# Patient Record
Sex: MALE | Race: WHITE | URBAN - METROPOLITAN AREA
[De-identification: names, ages, dates, MRNs, and addresses within clinical notes are randomized per-mention and may not be internally consistent; named-entity substitution may affect disease eponyms.]

---

## 2020-12-28 ENCOUNTER — NURSE TRIAGE (OUTPATIENT)
Dept: OTHER | Facility: OTHER | Age: 30
End: 2020-12-28

## 2020-12-28 DIAGNOSIS — Z11.59 SPECIAL SCREENING EXAMINATION FOR UNSPECIFIED VIRAL DISEASE: Primary | ICD-10-CM

## 2020-12-29 DIAGNOSIS — Z11.59 SPECIAL SCREENING EXAMINATION FOR UNSPECIFIED VIRAL DISEASE: ICD-10-CM

## 2020-12-29 PROCEDURE — 87635 SARS-COV-2 COVID-19 AMP PRB: CPT | Performed by: FAMILY MEDICINE

## 2020-12-30 LAB — SARS-COV-2 RNA RESP QL NAA+PROBE: NEGATIVE

## 2024-01-14 PROBLEM — L30.9 ECZEMA: Status: ACTIVE | Noted: 2024-01-14

## 2024-01-14 PROBLEM — J45.20 MILD INTERMITTENT ASTHMA WITHOUT COMPLICATION: Status: ACTIVE | Noted: 2024-01-14

## 2024-01-14 PROBLEM — N39.44 ENURESIS, NOCTURNAL ONLY: Status: ACTIVE | Noted: 2024-01-14

## 2024-01-14 PROBLEM — G89.29 CHRONIC PAIN OF LEFT ANKLE: Status: ACTIVE | Noted: 2024-01-14

## 2024-01-14 PROBLEM — F33.1 MDD (MAJOR DEPRESSIVE DISORDER), RECURRENT EPISODE, MODERATE (HCC): Status: ACTIVE | Noted: 2024-01-14

## 2024-01-14 PROBLEM — E55.9 VITAMIN D DEFICIENCY: Status: ACTIVE | Noted: 2024-01-14

## 2024-01-14 PROBLEM — E78.2 MIXED HYPERLIPIDEMIA: Status: ACTIVE | Noted: 2024-01-14

## 2024-01-14 PROBLEM — M25.572 CHRONIC PAIN OF LEFT ANKLE: Status: ACTIVE | Noted: 2024-01-14

## 2024-01-18 ENCOUNTER — OFFICE VISIT (OUTPATIENT)
Dept: FAMILY MEDICINE CLINIC | Facility: CLINIC | Age: 34
End: 2024-01-18
Payer: COMMERCIAL

## 2024-01-18 ENCOUNTER — APPOINTMENT (OUTPATIENT)
Dept: LAB | Facility: CLINIC | Age: 34
End: 2024-01-18
Payer: COMMERCIAL

## 2024-01-18 VITALS
DIASTOLIC BLOOD PRESSURE: 90 MMHG | TEMPERATURE: 97.2 F | HEART RATE: 78 BPM | HEIGHT: 69 IN | OXYGEN SATURATION: 98 % | SYSTOLIC BLOOD PRESSURE: 124 MMHG | WEIGHT: 315 LBS | RESPIRATION RATE: 20 BRPM | BODY MASS INDEX: 46.65 KG/M2

## 2024-01-18 DIAGNOSIS — E66.01 CLASS 3 SEVERE OBESITY DUE TO EXCESS CALORIES WITH SERIOUS COMORBIDITY AND BODY MASS INDEX (BMI) OF 50.0 TO 59.9 IN ADULT (HCC): ICD-10-CM

## 2024-01-18 DIAGNOSIS — Z11.4 SCREENING FOR HIV (HUMAN IMMUNODEFICIENCY VIRUS): ICD-10-CM

## 2024-01-18 DIAGNOSIS — G47.00 PERSISTENT INSOMNIA: ICD-10-CM

## 2024-01-18 DIAGNOSIS — N52.9 ERECTILE DYSFUNCTION, UNSPECIFIED ERECTILE DYSFUNCTION TYPE: ICD-10-CM

## 2024-01-18 DIAGNOSIS — F33.1 MDD (MAJOR DEPRESSIVE DISORDER), RECURRENT EPISODE, MODERATE (HCC): ICD-10-CM

## 2024-01-18 DIAGNOSIS — Z11.59 NEED FOR HEPATITIS C SCREENING TEST: ICD-10-CM

## 2024-01-18 DIAGNOSIS — F41.9 ANXIETY: Chronic | ICD-10-CM

## 2024-01-18 DIAGNOSIS — E55.9 VITAMIN D DEFICIENCY: ICD-10-CM

## 2024-01-18 DIAGNOSIS — E03.9 ADULT HYPOTHYROIDISM: ICD-10-CM

## 2024-01-18 DIAGNOSIS — F41.9 ANXIETY: ICD-10-CM

## 2024-01-18 DIAGNOSIS — E78.2 MIXED HYPERLIPIDEMIA: ICD-10-CM

## 2024-01-18 DIAGNOSIS — E03.8 SUBCLINICAL HYPOTHYROIDISM: ICD-10-CM

## 2024-01-18 DIAGNOSIS — R79.89 LOW TESTOSTERONE: ICD-10-CM

## 2024-01-18 DIAGNOSIS — Z00.00 WELL ADULT EXAM: Primary | ICD-10-CM

## 2024-01-18 DIAGNOSIS — Z23 ENCOUNTER FOR IMMUNIZATION: ICD-10-CM

## 2024-01-18 DIAGNOSIS — Z00.00 WELL ADULT EXAM: ICD-10-CM

## 2024-01-18 DIAGNOSIS — R74.8 ELEVATED LIVER ENZYMES: ICD-10-CM

## 2024-01-18 DIAGNOSIS — R68.82 LOW LIBIDO: ICD-10-CM

## 2024-01-18 DIAGNOSIS — F90.2 ATTENTION DEFICIT HYPERACTIVITY DISORDER (ADHD), COMBINED TYPE: ICD-10-CM

## 2024-01-18 LAB
25(OH)D3 SERPL-MCNC: 22.8 NG/ML (ref 30–100)
ALBUMIN SERPL BCP-MCNC: 4.5 G/DL (ref 3.5–5)
ALP SERPL-CCNC: 57 U/L (ref 34–104)
ALT SERPL W P-5'-P-CCNC: 74 U/L (ref 7–52)
ANION GAP SERPL CALCULATED.3IONS-SCNC: 9 MMOL/L
AST SERPL W P-5'-P-CCNC: 58 U/L (ref 13–39)
BASOPHILS # BLD AUTO: 0.05 THOUSANDS/ÂΜL (ref 0–0.1)
BASOPHILS NFR BLD AUTO: 1 % (ref 0–1)
BILIRUB SERPL-MCNC: 1.04 MG/DL (ref 0.2–1)
BUN SERPL-MCNC: 11 MG/DL (ref 5–25)
CALCIUM SERPL-MCNC: 9.5 MG/DL (ref 8.4–10.2)
CHLORIDE SERPL-SCNC: 102 MMOL/L (ref 96–108)
CHOLEST SERPL-MCNC: 253 MG/DL
CO2 SERPL-SCNC: 24 MMOL/L (ref 21–32)
CREAT SERPL-MCNC: 1.11 MG/DL (ref 0.6–1.3)
EOSINOPHIL # BLD AUTO: 0.18 THOUSAND/ÂΜL (ref 0–0.61)
EOSINOPHIL NFR BLD AUTO: 3 % (ref 0–6)
ERYTHROCYTE [DISTWIDTH] IN BLOOD BY AUTOMATED COUNT: 12.4 % (ref 11.6–15.1)
GFR SERPL CREATININE-BSD FRML MDRD: 86 ML/MIN/1.73SQ M
GLUCOSE P FAST SERPL-MCNC: 86 MG/DL (ref 65–99)
HCT VFR BLD AUTO: 44.1 % (ref 36.5–49.3)
HDLC SERPL-MCNC: 45 MG/DL
HGB BLD-MCNC: 14.8 G/DL (ref 12–17)
IMM GRANULOCYTES # BLD AUTO: 0.02 THOUSAND/UL (ref 0–0.2)
IMM GRANULOCYTES NFR BLD AUTO: 0 % (ref 0–2)
LDLC SERPL CALC-MCNC: 180 MG/DL (ref 0–100)
LYMPHOCYTES # BLD AUTO: 1.49 THOUSANDS/ÂΜL (ref 0.6–4.47)
LYMPHOCYTES NFR BLD AUTO: 24 % (ref 14–44)
MCH RBC QN AUTO: 30.8 PG (ref 26.8–34.3)
MCHC RBC AUTO-ENTMCNC: 33.6 G/DL (ref 31.4–37.4)
MCV RBC AUTO: 92 FL (ref 82–98)
MONOCYTES # BLD AUTO: 0.89 THOUSAND/ÂΜL (ref 0.17–1.22)
MONOCYTES NFR BLD AUTO: 14 % (ref 4–12)
NEUTROPHILS # BLD AUTO: 3.58 THOUSANDS/ÂΜL (ref 1.85–7.62)
NEUTS SEG NFR BLD AUTO: 58 % (ref 43–75)
NONHDLC SERPL-MCNC: 208 MG/DL
NRBC BLD AUTO-RTO: 0 /100 WBCS
PLATELET # BLD AUTO: 219 THOUSANDS/UL (ref 149–390)
PMV BLD AUTO: 9.9 FL (ref 8.9–12.7)
POTASSIUM SERPL-SCNC: 4.3 MMOL/L (ref 3.5–5.3)
PROT SERPL-MCNC: 7.5 G/DL (ref 6.4–8.4)
RBC # BLD AUTO: 4.8 MILLION/UL (ref 3.88–5.62)
SODIUM SERPL-SCNC: 135 MMOL/L (ref 135–147)
TRIGL SERPL-MCNC: 141 MG/DL
TSH SERPL DL<=0.05 MIU/L-ACNC: 5.76 UIU/ML (ref 0.45–4.5)
WBC # BLD AUTO: 6.21 THOUSAND/UL (ref 4.31–10.16)

## 2024-01-18 PROCEDURE — 99204 OFFICE O/P NEW MOD 45 MIN: CPT | Performed by: FAMILY MEDICINE

## 2024-01-18 PROCEDURE — 86803 HEPATITIS C AB TEST: CPT

## 2024-01-18 PROCEDURE — 80053 COMPREHEN METABOLIC PANEL: CPT

## 2024-01-18 PROCEDURE — 90715 TDAP VACCINE 7 YRS/> IM: CPT

## 2024-01-18 PROCEDURE — 82306 VITAMIN D 25 HYDROXY: CPT

## 2024-01-18 PROCEDURE — 36415 COLL VENOUS BLD VENIPUNCTURE: CPT

## 2024-01-18 PROCEDURE — 84439 ASSAY OF FREE THYROXINE: CPT

## 2024-01-18 PROCEDURE — 99385 PREV VISIT NEW AGE 18-39: CPT | Performed by: FAMILY MEDICINE

## 2024-01-18 PROCEDURE — 85025 COMPLETE CBC W/AUTO DIFF WBC: CPT

## 2024-01-18 PROCEDURE — 80061 LIPID PANEL: CPT

## 2024-01-18 PROCEDURE — 87389 HIV-1 AG W/HIV-1&-2 AB AG IA: CPT | Performed by: FAMILY MEDICINE

## 2024-01-18 PROCEDURE — 90471 IMMUNIZATION ADMIN: CPT

## 2024-01-18 PROCEDURE — 84443 ASSAY THYROID STIM HORMONE: CPT

## 2024-01-18 RX ORDER — MOMETASONE FUROATE 1 MG/G
OINTMENT TOPICAL
COMMUNITY
Start: 2023-10-16

## 2024-01-18 RX ORDER — LEVOTHYROXINE SODIUM 0.03 MG/1
25 TABLET ORAL DAILY
COMMUNITY
Start: 2023-10-16

## 2024-01-18 RX ORDER — DIPHENOXYLATE HYDROCHLORIDE AND ATROPINE SULFATE 2.5; .025 MG/1; MG/1
1 TABLET ORAL DAILY
COMMUNITY
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
## 2024-01-18 NOTE — ASSESSMENT & PLAN NOTE
Not at goal. He will check on coverage for GLP. Reviewed side effects and that this is a lifetime medication.     Pt has not had success with weight loss with healthy diet and exercise alone, but will continue to work on these.   he  has not been on any weight loss medicines in the past 12 months  Body mass index is 58.12 kg/m².  This is a new start - he is not on another GLP medication currently or in the past 12 months   No personal or family history of thyroid cancer  No personal history of pancreatitis  A GLP medication is recommended for improvement in weight and comorbidities associated with obesity   Risks and benefits and side effects of medication reviewed   Follow up 3 months after starting GLP

## 2024-01-18 NOTE — ASSESSMENT & PLAN NOTE
Not at goal. Was on Prozac 80 mg. Recommend we try a different SSRI and then consider addition of wellbutrin if needed. Risks and benefits and side effects reviewed with pt. Start zoloft 25 mg x 8 days then 50 mg. Can add wellbutrin at follow up, or we can start sooner if he wishes.

## 2024-01-18 NOTE — ASSESSMENT & PLAN NOTE
Diagnosed by neuropsych eval at [facility]. Will request records. History c/w ADHD. Recommend treatment at follow up.

## 2024-01-19 LAB
HCV AB SER QL: NORMAL
HIV 1+2 AB+HIV1 P24 AG SERPL QL IA: NORMAL
HIV 2 AB SERPL QL IA: NORMAL
HIV1 AB SERPL QL IA: NORMAL
HIV1 P24 AG SERPL QL IA: NORMAL
T4 FREE SERPL-MCNC: 1.01 NG/DL (ref 0.61–1.12)

## 2024-01-19 RX ORDER — ERGOCALCIFEROL 1.25 MG/1
50000 CAPSULE ORAL WEEKLY
Qty: 12 CAPSULE | Refills: 0 | Status: SHIPPED | OUTPATIENT
Start: 2024-01-19

## 2024-01-23 ENCOUNTER — TELEPHONE (OUTPATIENT)
Dept: FAMILY MEDICINE CLINIC | Facility: CLINIC | Age: 34
End: 2024-01-23

## 2024-01-23 NOTE — TELEPHONE ENCOUNTER
It looks like the testosterone was not done on this patient, but I am not sure why. Please be sure the lab made the patient aware.

## 2024-01-24 ENCOUNTER — LAB (OUTPATIENT)
Dept: LAB | Age: 34
End: 2024-01-24
Payer: COMMERCIAL

## 2024-01-24 DIAGNOSIS — E03.8 SUBCLINICAL HYPOTHYROIDISM: ICD-10-CM

## 2024-01-24 LAB
T4 FREE SERPL-MCNC: 1.05 NG/DL (ref 0.61–1.12)
TSH SERPL DL<=0.05 MIU/L-ACNC: 7.18 UIU/ML (ref 0.45–4.5)

## 2024-01-24 PROCEDURE — 84402 ASSAY OF FREE TESTOSTERONE: CPT

## 2024-01-24 PROCEDURE — 84403 ASSAY OF TOTAL TESTOSTERONE: CPT

## 2024-01-24 PROCEDURE — 86376 MICROSOMAL ANTIBODY EACH: CPT

## 2024-01-24 PROCEDURE — 84443 ASSAY THYROID STIM HORMONE: CPT

## 2024-01-24 PROCEDURE — 84439 ASSAY OF FREE THYROXINE: CPT

## 2024-01-24 PROCEDURE — 36415 COLL VENOUS BLD VENIPUNCTURE: CPT

## 2024-01-25 LAB
TESTOST FREE SERPL-MCNC: 7.8 PG/ML (ref 8.7–25.1)
TESTOST SERPL-MCNC: 408 NG/DL (ref 264–916)
THYROPEROXIDASE AB SERPL-ACNC: <9 IU/ML (ref 0–34)

## 2024-01-27 PROBLEM — R79.89 ELEVATED LFTS: Status: ACTIVE | Noted: 2024-01-27

## 2024-01-27 PROBLEM — R79.89 LOW VITAMIN D LEVEL: Status: ACTIVE | Noted: 2024-01-27

## 2024-01-27 PROBLEM — E78.00 HYPERCHOLESTEROLEMIA: Status: ACTIVE | Noted: 2024-01-27

## 2024-01-27 PROBLEM — E03.8 SUBCLINICAL HYPOTHYROIDISM: Status: ACTIVE | Noted: 2024-01-18

## 2024-01-27 NOTE — RESULT ENCOUNTER NOTE
Call patient with results -per the last lab result I wanted him to repeat his thyroid blood work in about a month.  He has this too early.  I will order repeat blood work when I see him at his visit.  But you can let him know that the TPO antibodies were normal, meaning his elevated TSH will likely return to normal without needing treatment.

## 2024-02-09 DIAGNOSIS — F41.9 ANXIETY: Chronic | ICD-10-CM

## 2024-02-09 DIAGNOSIS — F33.1 MDD (MAJOR DEPRESSIVE DISORDER), RECURRENT EPISODE, MODERATE (HCC): ICD-10-CM

## 2024-02-18 PROBLEM — E78.00 PURE HYPERCHOLESTEROLEMIA: Status: ACTIVE | Noted: 2024-01-14

## 2024-02-18 PROBLEM — R79.89 LOW TESTOSTERONE: Status: ACTIVE | Noted: 2024-02-18

## 2024-02-20 ENCOUNTER — OFFICE VISIT (OUTPATIENT)
Dept: FAMILY MEDICINE CLINIC | Facility: CLINIC | Age: 34
End: 2024-02-20
Payer: COMMERCIAL

## 2024-02-20 VITALS
BODY MASS INDEX: 46.65 KG/M2 | SYSTOLIC BLOOD PRESSURE: 124 MMHG | DIASTOLIC BLOOD PRESSURE: 82 MMHG | WEIGHT: 315 LBS | RESPIRATION RATE: 17 BRPM | HEIGHT: 69 IN | HEART RATE: 68 BPM | TEMPERATURE: 98.6 F | OXYGEN SATURATION: 98 %

## 2024-02-20 DIAGNOSIS — E78.00 PURE HYPERCHOLESTEROLEMIA: ICD-10-CM

## 2024-02-20 DIAGNOSIS — R79.89 ELEVATED LFTS: ICD-10-CM

## 2024-02-20 DIAGNOSIS — E55.9 VITAMIN D DEFICIENCY: ICD-10-CM

## 2024-02-20 DIAGNOSIS — F33.1 MDD (MAJOR DEPRESSIVE DISORDER), RECURRENT EPISODE, MODERATE (HCC): Primary | ICD-10-CM

## 2024-02-20 DIAGNOSIS — F41.9 ANXIETY: Chronic | ICD-10-CM

## 2024-02-20 DIAGNOSIS — R06.83 SNORING: Chronic | ICD-10-CM

## 2024-02-20 DIAGNOSIS — R79.89 LOW TESTOSTERONE: ICD-10-CM

## 2024-02-20 DIAGNOSIS — F90.2 ATTENTION DEFICIT HYPERACTIVITY DISORDER (ADHD), COMBINED TYPE: ICD-10-CM

## 2024-02-20 DIAGNOSIS — E66.01 CLASS 3 SEVERE OBESITY DUE TO EXCESS CALORIES WITH SERIOUS COMORBIDITY AND BODY MASS INDEX (BMI) OF 50.0 TO 59.9 IN ADULT (HCC): ICD-10-CM

## 2024-02-20 DIAGNOSIS — E03.8 SUBCLINICAL HYPOTHYROIDISM: ICD-10-CM

## 2024-02-20 PROCEDURE — 99214 OFFICE O/P EST MOD 30 MIN: CPT | Performed by: FAMILY MEDICINE

## 2024-02-20 RX ORDER — SERTRALINE HYDROCHLORIDE 100 MG/1
100 TABLET, FILM COATED ORAL DAILY
Qty: 90 TABLET | Refills: 3 | Status: SHIPPED | OUTPATIENT
Start: 2024-02-20

## 2024-02-20 RX ORDER — DEXTROAMPHETAMINE SACCHARATE, AMPHETAMINE ASPARTATE MONOHYDRATE, DEXTROAMPHETAMINE SULFATE AND AMPHETAMINE SULFATE 3.75; 3.75; 3.75; 3.75 MG/1; MG/1; MG/1; MG/1
15 CAPSULE, EXTENDED RELEASE ORAL EVERY MORNING
Qty: 30 CAPSULE | Refills: 0 | Status: SHIPPED | OUTPATIENT
Start: 2024-02-20

## 2024-02-20 NOTE — ASSESSMENT & PLAN NOTE
Not well controlled. Increase zoloft from 50 to 100 mg daily. Send MyChart message in 4 weeks or so.

## 2024-02-20 NOTE — ASSESSMENT & PLAN NOTE
Recommend Zepbound. He will check on coverage.   Pt has not had success with weight loss with healthy diet and exercise alone, but will continue to work on these.   he  has not been on any weight loss medicines in the past 12 months  Body mass index is 56.29 kg/m².  This is a new start - he is not on another GLP medication currently or in the past 12 months   No personal or family history of thyroid cancer  No personal history of pancreatitis  A GLP medication is recommended for improvement in weight and comorbidities associated with obesity   Risks and benefits and side effects of medication reviewed   Follow up 3 months after starting GLP

## 2024-02-20 NOTE — PATIENT INSTRUCTIONS
Increase zoloft from 50 to 100 mg daily   Start Adderall 20 mg before work or the start of your day.     Weight Loss Medications - please read carefully   Saxenda, Wegovy, *Zepbound are FDA approved for weight loss with a prior authorization. Call to see if these are approved by your insurance.     We have the additional options of Ozempic or Mounjaro if you have type 2 diabetes only.       How to start the process:  Call to see if one of the above medications are covered by your insurance.  Next call around to see if any pharmacies have the medication in stock. We cannot fill it until you know they have it in stock.   Next send us a [employer] message to let us know which one so we can send it in. The medication will need to be increased either weekly (for Saxenda) or monthly (for the others), so we cannot send it to a mail away pharmacy until you get to the maintenance dose.   Once  the prescription gets sent in we will need to get a prior auth from the insurance company. Allow 1 - 2 weeks for this process. If you have not hear anything regarding approval or denial from our office by this time, please reach out to us.  Once you start the medication you will need to contact our office every 2 - 3 weeks in order to get the next dose. Let us know your current dose and what pharmacy you want it sent to. You can call or send a [employer] message. Do not send a Medication Refill message, or have the pharmacy contact us,  or the last dose will be refilled automatically.   We will need to see you about every 3 months to check on how this is working and to communicate with your insurance company that you are doing well. Please keep these scheduled appointments.     Saxenda  This is how to start Saxenda. It is a daily injection to help with weight loss.     WEEK 1: 0.6mg daily  -if tolerated,  WEEK 2: 1.2mg daily  -if tolerated,  WEEK 3: 1.8mg daily  -if tolerated,  WEEK 4: 2.4mg daily  -if tolerated,  WEEK 5: 3mg daily and then  continue at this dose     If you miss more than 3 days of the medication you should restart this from the beginning.     VISIT [email or web address] to see about coupons, how to give yourself injections, and more information     If you develop nausea or vomiting, STOP the medication for a few days, then DECREASE to the previous dose that you tolerated well.   Stay well hydrated.  You can inject in your stomach, upper leg or upper arm   Unused pens should be stored in the refrigerator, but the pen in use can he kept at room temperature for up to 30 days   If you develop severe abdominal pain, pain radiating from your abdomen to the back, or fever associated with abdominal pain/vomiting, please call or go to the ER as this can be a sign of pancreatitis which can be an adverse effect of the medication.    Action: Cause slower gastric emptying which will increase feeling full with meals.  This feeling fullness will allow for you to reduce calorie intake sooner than usual and begin to lose some weight.  These medications also will reduce production of glucose form your liver to allow for better control of your sugars.   Reminders: These medications do not cause low blood sugars.   Keep in the refrigerator until you use it once, then keep it out of refrigerator.  You ONLY need to prime the pen upon open it monthly.  Clean your hands and site of injection to avoid infection risks. Store used needles in old plastic laundry detergent bin or coffee bin, then tape it when full and discard in garbage.   Side Effects:  Common side effects include full sensation with eating, nausea which usually improves over the first 1-2 weeks. Soreness, redness or lump at site of injection.  Dangers:  Pancreatitis can happen with this medicine for some people; therefore if you have ever had pancreatitis or have severe nausea vomiting and abdominal pain while on this medicine stop it immediately and call us or go to ER for assistance. Do not use if you  have had a history of thyroid cancer or a family history of MEN syndrome. If you are diabetic and use this medication with insulin or sulfonylureas there is a risk of low blood sugar. Monitor your sugars more closely.  If you are not able to urinate properly this could be a sign of a kidney problem. Let us know right away. In the clinical trials there were issues with gallbladder problems like gallstones. Let us know if you develop severe abdominal pain. If you have new depression or thoughts of suicide you should also contact us right away, and call 911 or go to your nearest emergency room.     Zepbound   Month 1:  2.5 mg subQ once weekly for 4 weeks  Month 2:  5 mg weekly x 4 weeks    Month 3:  7.5 mg weekly x 4 weeks   Month 4:  10 mg weekly x 4 weeks   Month 5: 12.5 mg weekly x 4 weeks   Month 6:  15 mg weekly (max dose/maintenance dose)       How to give it:  Inject subQ into the thigh, abdomen, or upper arm; rotate injection sites 2,1.  Administer at any time of day, with or without meals 2,1.  Administer separately from insulin (do not mix the products); may inject both medications in the same body region but not adjacent to each other 2.  The day of the weekly administration can be changed as long as the time between the 2 doses is at least 3 days (72 hours) 2,1.  Administer a missed dose as soon as possible within 4 days (96 hours) of missed dose; if more than 4 days have passed, skip the missed dose and administer next dose on regularly scheduled day and resume regular once weekly dosing schedule    Call if:  You feel fullness or pain in the thyroid area (front and middle of your neck)    Bad upper abdominal pain that is not going away.   Nausea or vomiting that persists.  Any changes in vision 2.  You have worsening depression, suicidal ideation, or unusual changes in behavior 1.    Other precautions:  It is advised if you use an oral hormonal contraceptive to switch to a non-oral contraceptive method, or  add a barrier method of contraception for 4 weeks after initiation and for 4 weeks after each dose escalation 2.  Side effects may include nausea, diarrhea, decreased appetite, vomiting, constipation, dyspepsia, and abdominal pain 2.  Take precautions to avoid dehydration 2.  If you have diabetes monitor for symptoms of hypoglycemia and report difficulties with glycemic control 1.  If you miss a dose administer a missed dose as soon as possible within 4 days. If more than 4 days have passed, skip the missed dose, administer the next dose on the regularly scheduled day, and resume the regular once-weekly dosing schedule 2.      Wegovy   Wegovy dosing:  Week 1 - 4:  0.25 mg weekly   Week 5 - 8:  0.5 mg weekly   Week 9 - 12:  1 mg weekly   Week 13 - 16:  1.7 mg weekly   Maintenance from there:  2.4 mg weekly.     Wegovy is a WEEKLY non-insulin injectable. GLP-1Agonist  Semaglutide (Wegovy) FDA approved in 2014 as adjunct to reduced calorie diet and increased physical activity for chronic weight management in adults with initial BMI of ? 30 kg/m2 or ? 27 kg/m2 with ? 1 weight-related comorbid condition. There were several studies run over 68 week periods of time.  During this time they diabetic patients lose on average 6.2% of their body weight. In a different trial involving non-diabetics the weight loss was closer to 12 - 15%.          Action: Cause slower gastric emptying which will increase feeling full with meals.  This feeling fullness will allow for you to reduce calorie intake sooner than usual and begin to lose some weight.  These medications also will reduce production of glucose form your liver to allow for better control of your sugars.       Reminders: These medications do not cause low blood sugars.   Keep in the refrigerator until you use it once, then keep it out of refrigerator.  Clean your hands and site of injection to avoid infection risks. Store used needles in old plastic laundry detergent bin or  coffee bin, then tape it when full and discard in garbage.      Side Effects:  Common side effects include full sensation with eating, nausea which usually improves over the first 1-2 weeks. Soreness, redness or lump at site of injection. Constipation, stomach pain, headache, fatigue, indigestion, dizziness, bloating, burping.      Dangers:  Pancreatitis can happen with this medicine for some people; therefore if you have ever had pancreatitis or have severe nausea vomiting and abdominal pain while on this medicine stop it immediately and call us or go to ER for assistance. Similarly gallbladder disease.  Do not use if you have had a history of thyroid canceror a family history of MEN syndrome.  It can cause low blood sugar if mixed with certain other diabetes medications.  If you have type 2 diabetes you should already be seeing an eye doctor - let them know you are on this medication and contact them if there are changes in your vision.  See the package insert for all serious possible side effects        Covered only if you have type 2 diabetes:  Ozempic  How to start Ozempic  Month 1:  0.25 mg weekly x 4 weeks   Month 2:  0.5 mg weekly x 4 weeks   Month 3:  1 mg weekly x 4 weeks   Month 4:  2 mg weekly thereafter     Go to the Ozempic or [employer] website to look coupons to save on this medication.   About the Ozempic® Pen  Ozempic® (semaglutide) injection 0.5 mg or 1 mg   Ozempic PI ([email or web address]-[email or web address])     Ozempic is a  WEEKLY non-insulin injectable. GLP-1Agonist  Victoza (liraglutide) daily, Byetta (exenatide) twice daily, Adlyxin (lixisenatide)  Trulicity (dulaglutide) Bydureon(Exenatide) , Ozempic (semaglutide), Rybelsus (semaglutide),          Action: Cause slower gastric emptying which will increase feeling full with meals.  This feeling fullness will allow for you to reduce calorie intake sooner than usual and begin to lose some weight.  These medications also will reduce production of glucose form your liver to  allow for better control of your sugars.       Reminders: These medications do not cause low blood sugars.   Keep in the refrigerator until you use it once, then keep it out of refrigerator.  Clean your hands and site of injection to avoid infection risks. Store used needles in old plastic laundry detergent bin or coffee bin, then tape it when full and discard in garbage.      Side Effects:  Common side effects include full sensation with eating, nausea which usually improves over the first 1-2 weeks. Soreness, redness or lump at site of injection.     Dangers:  Pancreatitis can happen with this medicine for some people; therefore if you have ever had pancreatitis or have severe nausea vomiting and abdominal pain while on this medicine stop it immediately and call us or go to ER for assistance. Do not use if you have had a history of thyroid canceror a family history of MEN syndrome.     Mounjarno   Mounjaro (tirzepatide) dosing instructions  Month 1 -  2.5 mg weekly x 4 weeks   Month 2 - 5 mg weekly x 4 weeks  Month 3 - 7.5 mg weekly x 4 week   Month 4 - 10 mg weekly x 4 weeks  Month 5 - 12.5 mg weekly x 4 weeks   Week 6 and thereafter - 15 mg weekly     You can inject this in your stomach, thigh, or upper arm.   For savings card program go to [employer] or  [id number] ([phone])  Common side effects  nausea, diarrhea, decreased appetite, vomiting, constipation, indigestion, and stomach pain.   To help these try eating smaller meals, stop eating when you feel full, avoid eating fat or fatty foods, try eating bland foods like toast, crackers or rice.   If you take birth control pills by mouth these may not work as well while you are on this medication and you may want to increase your dose or use another form of birth control.     Contraindicated with medullary thyroid carcinoma MTC or MEN2.   It may cause tumors of the thyroid, including thyroid cancer.   Call right away and stop medication if severe  pain in stomach with or without vomiting (pancreatitis)   Watch for low blood sugar if taking this medication with a sulfanuria or insulin.   Drink plenty of fluids to avoid dehydration.   If you having gallbladder issues such as pain in your upper abdomen, yellowing of your skin, fever, ana colored stools, stop this medicine can all your doctor.   Call if you have sudden change in your vision.

## 2024-02-22 ENCOUNTER — HOSPITAL ENCOUNTER (OUTPATIENT)
Dept: RADIOLOGY | Age: 34
Discharge: HOME | End: 2024-02-22
Payer: COMMERCIAL

## 2024-02-22 DIAGNOSIS — R79.89 ELEVATED LFTS: ICD-10-CM

## 2024-02-22 PROCEDURE — 76700 US EXAM ABDOM COMPLETE: CPT

## 2024-02-25 NOTE — ASSESSMENT & PLAN NOTE
[address] [facility] [address] [facility] 
[phone] Patient: Lavelle Arroyo MRN: [id number] NIT:[birth year] Visit Information Date & Time Provider Department Dept. Phone Encounter #  
 4/24/2018  1:50 PM Joss Stauffer MD [facility] [phone] [id number] Upcoming Health Maintenance Date Due Influenza Age 5 to Adult 8/1/2017 PAP AKA CERVICAL CYTOLOGY 10/5/2020 Allergies as of 4/24/2018  Review Complete On: 4/24/2018 By: Edmund Parikh LPN Severity Noted Reaction Type Reactions Amoxicillin  01/10/2013   Systemic Hives Current Immunizations  Reviewed on 5/2/2016 Name Date Influenza Vaccine Donney Gum) 11/4/2015 Tdap 2/20/2018, 2/10/2016 Not reviewed this visit Vitals BP Height(growth percentile) Weight(growth percentile) LMP BMI OB Status 120/72 4' 11\" (1.499 m) 188 lb (85.3 kg) 07/27/2017 37.97 kg/m2 Pregnant Smoking Status Current Every Day Smoker BMI and BSA Data Body Mass Index Body Surface Area  
 37.97 kg/m 2 1.88 m 2 Preferred Pharmacy Pharmacy Name Phone [id number]- [place], [place] - [address] [facility] [phone] Your Updated Medication List  
  
   
This list is accurate as of 4/24/18  2:00 PM.  Always use your most recent med list.  
  
  
  
  
 butalbital-acetaminophen-caffeine -40 mg per tablet Commonly known as:  Berton Cons Take 1 Tab by mouth every six (6) hours as needed for Pain. PRENATAL VITAMIN PO Take  by mouth. * SYNTHROID 125 mcg tablet Generic drug:  levothyroxine TAKE 1 TABLET BY MOUTH EVERY DAY  
  
 * SYNTHROID 175 mcg tablet Generic drug:  levothyroxine TK 1 T PO D  
  
 * Notice:   This list has 2 medication(s) that are the same as other Already referred to urology.    medications prescribed for you. Read the directions carefully, and ask your doctor or other care provider to review them with you. Patient Instructions Week 38 of Your Pregnancy: Care Instructions Your Care Instructions Believe it or not, your baby is almost here. You may have ideas about your baby's personality because of how much he or she moves. Or you may have noticed how he or she responds to sounds, warmth, cold, and light. You may even know what kind of music your baby likes. By now, you have a better idea of what to expect during delivery. You may have talked about your birth preferences with your doctor. But even if you want a vaginal birth, it is a good idea to learn about  births.  birth means that your baby is born through a cut (incision) in your lower belly. It is sometimes the best choice for the health of the baby and the mother. This care sheet can help you understand  births. It also gives you information about what to expect after your baby is born. And it helps you understand more about postpartum depression. Follow-up care is a key part of your treatment and safety. Be sure to make and go to all appointments, and call your doctor if you are having problems. It's also a good idea to know your test results and keep a list of the medicines you take. How can you care for yourself at home? Learn about  birth · Most C-sections are unplanned. They are done because of problems that occur during labor. These problems might include: 
¨ Labor that slows or stops. ¨ High blood pressure or other problems for the mother. ¨ Signs of distress in the baby. These signs may include a very fast or slow heart rate. · Although most mothers and babies do well after , it is major surgery. It has more risks than a vaginal delivery. · In some cases, a planned  may be safer than a vaginal delivery. This may be the case if: ¨ The mother has a health problem, such as a heart condition. ¨ The baby isn't in a head-down position for delivery. This is called a breech position. ¨ The uterus has scars from past surgeries. This could increase the chance of a tear in the uterus. ¨ There is a problem with the placenta. ¨ The mother has an infection, such as genital herpes, that could be spread to the baby. ¨ The mother is having twins or more. ¨ The baby weighs 9 to 10 pounds or more. · Because of the risks of , planned C-sections generally should be done only for medical reasons. And a planned  should be done at 39 weeks or later unless there is a medical reason to do it sooner. Know what to expect after delivery, and plan for the first few weeks at home · You, your baby, and your partner or  will get identification bands. Only people with matching bands can  the baby from the nursery. · You will learn how to feed, diaper, and bathe your baby. And you will learn how to care for the umbilical cord stump. If your baby will be circumcised, you will also learn how to care for that. · Ask people to wait to visit you until you are at home. And ask them to wash their hands before they touch your baby. · Make sure you have another adult in your home for at least 2 or 3 days after the birth. · During the first 2 weeks, limit when friends and family can visit. · Do not allow visitors who have colds or infections. Make sure all visitors are up to date with their vaccinations. Never let anyone smoke around your baby. · Try to nap when the baby naps. Be aware of postpartum depression · \"Baby blues\" are common for the first 1 to 2 weeks after birth. You may cry or feel sad or irritable for no reason. · For some women, these feelings last longer and are more intense. This is called postpartum depression. · If your symptoms last for more than a few weeks or you feel very depressed, ask your doctor for help. · Postpartum depression can be treated. Support groups and counseling can help. Sometimes medicine can also help. Where can you learn more? Go to http://jo ann-elizabeth.info/. Enter B044 in the search box to learn more about \"Week 38 of Your Pregnancy: Care Instructions. \" Current as of: March 16, 2017 Content Version: 11.4 © 1244-5974 [employer]. Care instructions adapted under license by [employer] (which disclaims liability or warranty for this information). If you have questions about a medical condition or this instruction, always ask your healthcare professional. [address] any warranty or liability for your use of this information. Introducing [facility] & HE[facility]! Kim De León introduces [employer] patient portal. Now you can access parts of your medical record, email your doctor's office, and request medication refills online. 1. In your internet browser, go to [email or web address] 2. Click on the First Time User? Click Here link in the Sign In box. You will see the New Member Sign Up page. 3. Enter your [employer] Access Code exactly as it appears below. You will not need to use this code after youve completed the sign-up process. If you do not sign up before the expiration date, you must request a new code. · [employer] Access Code: [email or web address] Expires: 7/11/2018  8:04 AM 
 
4. Enter the last four digits of your Social Security Number (xxxx) and Date of Birth (mm/dd/yyyy) as indicated and click Submit. You will be taken to the next sign-up page. 5. Create a [employer] ID. This will be your [employer] login ID and cannot be changed, so think of one that is secure and easy to remember. 6. Create a [employer] password. You can change your password at any time. 7. Enter your Password Reset Question and Answer. This can be used at a later time if you forget your password. 8. Enter your e-mail address. You will receive e-mail notification when new information is available in [address] 9. Click Sign Up. You can now view and download portions of your medical record. 10. Click the Download Summary menu link to download a portable copy of your medical information. If you have questions, please visit the Frequently Asked Questions section of the [employer] website. Remember, [employer] is NOT to be used for urgent needs. For medical emergencies, dial 911. Now available from your iPhone and Android! Please provide this summary of care documentation to your next provider. Your primary care clinician is listed as Liliana Calhoun. If you have any questions after today's visit, please call [phone].

## 2024-02-25 NOTE — ASSESSMENT & PLAN NOTE
Reviewed symptoms, previous meds, risks and benefits and side effects. Agrees to start Adderall 20 mg. Controlled Substance Review    [place] or [place]  reviewed: No red flags were identified; safe to proceed with prescription..  Follow up in 4 weeks

## 2024-02-25 NOTE — ASSESSMENT & PLAN NOTE
Not well controlled. Pt to work on diet and exericise. Recommend zepbound. Weight loss should help get to goal.

## 2024-03-01 PROBLEM — K76.0 FATTY LIVER: Status: ACTIVE | Noted: 2024-03-01

## 2024-04-14 DIAGNOSIS — E55.9 VITAMIN D DEFICIENCY: ICD-10-CM

## 2024-04-24 DIAGNOSIS — F90.2 ATTENTION DEFICIT HYPERACTIVITY DISORDER (ADHD), COMBINED TYPE: ICD-10-CM

## 2024-04-24 DIAGNOSIS — F33.1 MDD (MAJOR DEPRESSIVE DISORDER), RECURRENT EPISODE, MODERATE (HCC): ICD-10-CM

## 2024-04-24 DIAGNOSIS — E03.8 SUBCLINICAL HYPOTHYROIDISM: Primary | ICD-10-CM

## 2024-04-24 DIAGNOSIS — F41.9 ANXIETY: Chronic | ICD-10-CM

## 2024-04-25 RX ORDER — ERGOCALCIFEROL 1.25 MG/1
50000 CAPSULE ORAL WEEKLY
Qty: 12 CAPSULE | Refills: 0 | OUTPATIENT
Start: 2024-04-25

## 2024-04-25 RX ORDER — DEXTROAMPHETAMINE SACCHARATE, AMPHETAMINE ASPARTATE MONOHYDRATE, DEXTROAMPHETAMINE SULFATE AND AMPHETAMINE SULFATE 3.75; 3.75; 3.75; 3.75 MG/1; MG/1; MG/1; MG/1
15 CAPSULE, EXTENDED RELEASE ORAL EVERY MORNING
Qty: 30 CAPSULE | Refills: 0 | OUTPATIENT
Start: 2024-04-25

## 2024-04-25 RX ORDER — SERTRALINE HYDROCHLORIDE 100 MG/1
100 TABLET, FILM COATED ORAL DAILY
Qty: 90 TABLET | Refills: 1 | Status: SHIPPED | OUTPATIENT
Start: 2024-04-25

## 2024-04-25 NOTE — TELEPHONE ENCOUNTER
Called patient, left message for patient to call the office back.    Please refuse medication refill request as patient has not called the office back.

## 2024-04-25 NOTE — TELEPHONE ENCOUNTER
Pt no showed for his follow up with PCP on 3/19/24 which was a f/u on ADHD after first month on adderall.   Refill declined- he needs to call and schedule f/u visit.

## 2024-04-30 RX ORDER — LEVOTHYROXINE SODIUM 0.03 MG/1
25 TABLET ORAL DAILY
Qty: 90 TABLET | Refills: 0 | Status: SHIPPED | OUTPATIENT
Start: 2024-04-30

## 2024-12-19 ENCOUNTER — TELEPHONE (OUTPATIENT)
Dept: FAMILY MEDICINE CLINIC | Facility: CLINIC | Age: 34
End: 2024-12-19

## 2024-12-19 NOTE — TELEPHONE ENCOUNTER
"Pt self scheduled for \"general check up\"   Physical is due 1/18/24 - please schedule physical on or after that time. Remind him there is fasting blood work ordered for him.   He is overdue for a mood check - he can keep upcoming appointment for that if wishes.   "

## 2024-12-20 NOTE — TELEPHONE ENCOUNTER
Called pt lm that his physical is due anytime after 1/18/24 but there is no openings for a physical until March. Pt is to call back and let us know if he still wants to keep his 12/30/24 CC visit or wait until March and do physical one time. If pt does want to make physical his upcoming appt please schedule and remind him to get labs done prior to that physical visit.

## 2024-12-27 NOTE — TELEPHONE ENCOUNTER
Called pt and he said he wants to keep his 12/30/24 for cc and will schedule physical when he comes in for the other appt.

## 2024-12-30 ENCOUNTER — RESULTS FOLLOW-UP (OUTPATIENT)
Dept: FAMILY MEDICINE CLINIC | Facility: CLINIC | Age: 34
End: 2024-12-30

## 2024-12-30 ENCOUNTER — OFFICE VISIT (OUTPATIENT)
Dept: FAMILY MEDICINE CLINIC | Facility: CLINIC | Age: 34
End: 2024-12-30
Payer: COMMERCIAL

## 2024-12-30 ENCOUNTER — APPOINTMENT (OUTPATIENT)
Dept: LAB | Facility: CLINIC | Age: 34
End: 2024-12-30
Payer: COMMERCIAL

## 2024-12-30 ENCOUNTER — TELEPHONE (OUTPATIENT)
Dept: FAMILY MEDICINE CLINIC | Facility: CLINIC | Age: 34
End: 2024-12-30

## 2024-12-30 VITALS
RESPIRATION RATE: 20 BRPM | TEMPERATURE: 97.6 F | DIASTOLIC BLOOD PRESSURE: 86 MMHG | BODY MASS INDEX: 46.65 KG/M2 | OXYGEN SATURATION: 97 % | HEART RATE: 96 BPM | WEIGHT: 315 LBS | HEIGHT: 69 IN | SYSTOLIC BLOOD PRESSURE: 130 MMHG

## 2024-12-30 DIAGNOSIS — E66.813 CLASS 3 SEVERE OBESITY DUE TO EXCESS CALORIES WITH SERIOUS COMORBIDITY AND BODY MASS INDEX (BMI) OF 50.0 TO 59.9 IN ADULT (HCC): Primary | ICD-10-CM

## 2024-12-30 DIAGNOSIS — E66.01 CLASS 3 SEVERE OBESITY DUE TO EXCESS CALORIES WITH SERIOUS COMORBIDITY AND BODY MASS INDEX (BMI) OF 50.0 TO 59.9 IN ADULT (HCC): ICD-10-CM

## 2024-12-30 DIAGNOSIS — E03.8 SUBCLINICAL HYPOTHYROIDISM: ICD-10-CM

## 2024-12-30 DIAGNOSIS — F33.1 MDD (MAJOR DEPRESSIVE DISORDER), RECURRENT EPISODE, MODERATE (HCC): ICD-10-CM

## 2024-12-30 DIAGNOSIS — E55.9 VITAMIN D DEFICIENCY: ICD-10-CM

## 2024-12-30 DIAGNOSIS — K76.0 FATTY LIVER: ICD-10-CM

## 2024-12-30 DIAGNOSIS — F41.9 ANXIETY: Chronic | ICD-10-CM

## 2024-12-30 DIAGNOSIS — F90.2 ATTENTION DEFICIT HYPERACTIVITY DISORDER (ADHD), COMBINED TYPE: ICD-10-CM

## 2024-12-30 DIAGNOSIS — E66.01 CLASS 3 SEVERE OBESITY DUE TO EXCESS CALORIES WITH SERIOUS COMORBIDITY AND BODY MASS INDEX (BMI) OF 50.0 TO 59.9 IN ADULT (HCC): Primary | ICD-10-CM

## 2024-12-30 DIAGNOSIS — L20.84 INTRINSIC ECZEMA: ICD-10-CM

## 2024-12-30 DIAGNOSIS — E66.813 CLASS 3 SEVERE OBESITY DUE TO EXCESS CALORIES WITH SERIOUS COMORBIDITY AND BODY MASS INDEX (BMI) OF 50.0 TO 59.9 IN ADULT (HCC): ICD-10-CM

## 2024-12-30 DIAGNOSIS — J01.00 ACUTE NON-RECURRENT MAXILLARY SINUSITIS: ICD-10-CM

## 2024-12-30 DIAGNOSIS — G47.00 PERSISTENT INSOMNIA: ICD-10-CM

## 2024-12-30 DIAGNOSIS — E03.9 ACQUIRED HYPOTHYROIDISM: ICD-10-CM

## 2024-12-30 DIAGNOSIS — F32.2 SEVERE MAJOR DEPRESSIVE DISORDER (HCC): ICD-10-CM

## 2024-12-30 DIAGNOSIS — E03.8 SUBCLINICAL HYPOTHYROIDISM: Primary | ICD-10-CM

## 2024-12-30 DIAGNOSIS — J45.20 MILD INTERMITTENT ASTHMA WITHOUT COMPLICATION: ICD-10-CM

## 2024-12-30 LAB
25(OH)D3 SERPL-MCNC: 17.5 NG/ML (ref 30–100)
ALBUMIN SERPL BCG-MCNC: 4.5 G/DL (ref 3.5–5)
ALP SERPL-CCNC: 65 U/L (ref 34–104)
ALT SERPL W P-5'-P-CCNC: 36 U/L (ref 7–52)
ANION GAP SERPL CALCULATED.3IONS-SCNC: 5 MMOL/L (ref 4–13)
AST SERPL W P-5'-P-CCNC: 33 U/L (ref 13–39)
BASOPHILS # BLD AUTO: 0.06 THOUSANDS/ΜL (ref 0–0.1)
BASOPHILS NFR BLD AUTO: 1 % (ref 0–1)
BILIRUB SERPL-MCNC: 0.58 MG/DL (ref 0.2–1)
BUN SERPL-MCNC: 12 MG/DL (ref 5–25)
CALCIUM SERPL-MCNC: 9.6 MG/DL (ref 8.4–10.2)
CHLORIDE SERPL-SCNC: 102 MMOL/L (ref 96–108)
CHOLEST SERPL-MCNC: 247 MG/DL (ref ?–200)
CO2 SERPL-SCNC: 29 MMOL/L (ref 21–32)
CREAT SERPL-MCNC: 1.16 MG/DL (ref 0.6–1.3)
EOSINOPHIL # BLD AUTO: 0.34 THOUSAND/ΜL (ref 0–0.61)
EOSINOPHIL NFR BLD AUTO: 5 % (ref 0–6)
ERYTHROCYTE [DISTWIDTH] IN BLOOD BY AUTOMATED COUNT: 13.2 % (ref 11.6–15.1)
GFR SERPL CREATININE-BSD FRML MDRD: 81 ML/MIN/1.73SQ M
GLUCOSE P FAST SERPL-MCNC: 102 MG/DL (ref 65–99)
HCT VFR BLD AUTO: 48.7 % (ref 36.5–49.3)
HDLC SERPL-MCNC: 62 MG/DL
HGB BLD-MCNC: 16.3 G/DL (ref 12–17)
IMM GRANULOCYTES # BLD AUTO: 0.03 THOUSAND/UL (ref 0–0.2)
IMM GRANULOCYTES NFR BLD AUTO: 0 % (ref 0–2)
LDLC SERPL CALC-MCNC: 157 MG/DL (ref 0–100)
LYMPHOCYTES # BLD AUTO: 1.92 THOUSANDS/ΜL (ref 0.6–4.47)
LYMPHOCYTES NFR BLD AUTO: 28 % (ref 14–44)
MCH RBC QN AUTO: 30.4 PG (ref 26.8–34.3)
MCHC RBC AUTO-ENTMCNC: 33.5 G/DL (ref 31.4–37.4)
MCV RBC AUTO: 91 FL (ref 82–98)
MONOCYTES # BLD AUTO: 0.78 THOUSAND/ΜL (ref 0.17–1.22)
MONOCYTES NFR BLD AUTO: 12 % (ref 4–12)
NEUTROPHILS # BLD AUTO: 3.66 THOUSANDS/ΜL (ref 1.85–7.62)
NEUTS SEG NFR BLD AUTO: 54 % (ref 43–75)
NONHDLC SERPL-MCNC: 185 MG/DL
NRBC BLD AUTO-RTO: 0 /100 WBCS
PLATELET # BLD AUTO: 255 THOUSANDS/UL (ref 149–390)
PMV BLD AUTO: 9.8 FL (ref 8.9–12.7)
POTASSIUM SERPL-SCNC: 4.8 MMOL/L (ref 3.5–5.3)
PROT SERPL-MCNC: 7.2 G/DL (ref 6.4–8.4)
RBC # BLD AUTO: 5.36 MILLION/UL (ref 3.88–5.62)
SODIUM SERPL-SCNC: 136 MMOL/L (ref 135–147)
T4 FREE SERPL-MCNC: 0.75 NG/DL (ref 0.61–1.12)
TRIGL SERPL-MCNC: 141 MG/DL (ref ?–150)
TSH SERPL DL<=0.05 MIU/L-ACNC: 8.76 UIU/ML (ref 0.45–4.5)
WBC # BLD AUTO: 6.79 THOUSAND/UL (ref 4.31–10.16)

## 2024-12-30 PROCEDURE — 36415 COLL VENOUS BLD VENIPUNCTURE: CPT

## 2024-12-30 PROCEDURE — 84439 ASSAY OF FREE THYROXINE: CPT

## 2024-12-30 PROCEDURE — 82306 VITAMIN D 25 HYDROXY: CPT

## 2024-12-30 PROCEDURE — 85025 COMPLETE CBC W/AUTO DIFF WBC: CPT

## 2024-12-30 PROCEDURE — 99214 OFFICE O/P EST MOD 30 MIN: CPT | Performed by: FAMILY MEDICINE

## 2024-12-30 PROCEDURE — 80053 COMPREHEN METABOLIC PANEL: CPT

## 2024-12-30 PROCEDURE — 84443 ASSAY THYROID STIM HORMONE: CPT

## 2024-12-30 PROCEDURE — 80061 LIPID PANEL: CPT

## 2024-12-30 RX ORDER — TIRZEPATIDE 2.5 MG/.5ML
2.5 INJECTION, SOLUTION SUBCUTANEOUS WEEKLY
Qty: 2 ML | Refills: 0 | Status: SHIPPED | OUTPATIENT
Start: 2024-12-30 | End: 2024-12-31 | Stop reason: SDUPTHER

## 2024-12-30 RX ORDER — MOMETASONE FUROATE 1 MG/G
OINTMENT TOPICAL DAILY
Qty: 45 G | Refills: 0 | Status: SHIPPED | OUTPATIENT
Start: 2024-12-30

## 2024-12-30 RX ORDER — LEVOTHYROXINE SODIUM 50 UG/1
50 TABLET ORAL
Qty: 30 TABLET | Refills: 5 | Status: SHIPPED | OUTPATIENT
Start: 2024-12-30

## 2024-12-30 RX ORDER — SERTRALINE HYDROCHLORIDE 100 MG/1
100 TABLET, FILM COATED ORAL DAILY
Qty: 90 TABLET | Refills: 1 | Status: SHIPPED | OUTPATIENT
Start: 2024-12-30

## 2024-12-30 RX ORDER — DESONIDE 0.5 MG/G
CREAM TOPICAL 2 TIMES DAILY
Qty: 15 G | Refills: 1 | Status: SHIPPED | OUTPATIENT
Start: 2024-12-30

## 2024-12-30 RX ORDER — DEXTROAMPHETAMINE SACCHARATE, AMPHETAMINE ASPARTATE MONOHYDRATE, DEXTROAMPHETAMINE SULFATE AND AMPHETAMINE SULFATE 3.75; 3.75; 3.75; 3.75 MG/1; MG/1; MG/1; MG/1
15 CAPSULE, EXTENDED RELEASE ORAL EVERY MORNING
Qty: 90 CAPSULE | Refills: 0 | Status: SHIPPED | OUTPATIENT
Start: 2024-12-30

## 2024-12-30 RX ORDER — TRIAMCINOLONE ACETONIDE 55 UG/1
2 SPRAY, METERED NASAL DAILY
Qty: 16.9 ML | Refills: 2 | Status: SHIPPED | OUTPATIENT
Start: 2024-12-30

## 2024-12-30 NOTE — TELEPHONE ENCOUNTER
Fax received from [employer] requesting prior authorization for Zepbound 2.5mg. Patient instructions are Inject 0.5 mL (2.5 mg total) under the skin once a week for 28 days,       Key [id number]  Please initiate prior authorization

## 2024-12-30 NOTE — ASSESSMENT & PLAN NOTE
Restart elocon   Start desowen for face   Orders:  •  mometasone (ELOCON) 0.1 % ointment; Apply topically daily  •  desonide (DESOWEN) 0.05 % cream; Apply topically 2 (two) times a day To dry itchy area. 2 weeks maximum.

## 2024-12-30 NOTE — ASSESSMENT & PLAN NOTE
Update labs prior to treating again   Orders:  •  CBC and differential; Future  •  Comprehensive metabolic panel; Future  •  Lipid panel; Future  •  TSH, 3rd generation; Future  •  T4, free; Future  •  Vitamin D 25 hydroxy; Future

## 2024-12-30 NOTE — TELEPHONE ENCOUNTER
[place] Zepbound 2.5 MG/0.5ML SUBMITTED     to [facility]     via    []CMM-KEY:    [x]Surescripts-Case ID # [id number]   []Availity-Auth ID #  NDC #    []Faxed to plan   []Other website    []Phone call Case ID #      []PA sent as URGENT    All office notes, labs and other pertaining documents and studies sent. Clinical questions answered. Awaiting determination from insurance company.     Turnaround time for your insurance to make a decision on your Prior Authorization can take 7-21 business days.

## 2024-12-30 NOTE — ASSESSMENT & PLAN NOTE
Check LFTs   Work on weight loss   Orders:  •  CBC and differential; Future  •  Comprehensive metabolic panel; Future  •  Lipid panel; Future  •  TSH, 3rd generation; Future  •  T4, free; Future  •  Vitamin D 25 hydroxy; Future

## 2024-12-30 NOTE — PROGRESS NOTES
Assessment & Plan  MDD (major depressive disorder), recurrent episode, moderate (HCC)  Not well controlled  He has been out of meds   Restart zoloft and Adderall   Orders:  •  sertraline (ZOLOFT) 100 mg tablet; Take 1 tablet (100 mg total) by mouth daily  •  CBC and differential; Future  •  Comprehensive metabolic panel; Future  •  Lipid panel; Future  •  TSH, 3rd generation; Future  •  T4, free; Future  •  Vitamin D 25 hydroxy; Future    Anxiety  Not well controlled  He has been out of meds   Restart zoloft and Adderall   Orders:  •  sertraline (ZOLOFT) 100 mg tablet; Take 1 tablet (100 mg total) by mouth daily  •  CBC and differential; Future  •  Comprehensive metabolic panel; Future  •  Lipid panel; Future  •  TSH, 3rd generation; Future  •  T4, free; Future  •  Vitamin D 25 hydroxy; Future    Class 3 severe obesity due to excess calories with serious comorbidity and body mass index (BMI) of 50.0 to 59.9 in adult (HCC)  - will start zepbound   Recommend Zepbound (could also consider Wegovy and Saxenda)   Pt has not had success with weight loss with healthy diet and exercise alone, but will continue to work on these.     Patient is to call her insurance to see if this is covered, and then call pharmacies to find the medication.  When he does this we can submit the medication for prior authorization.  Follow up 3 months after starting GLP.    Patient has not been on any weight loss medicines in the past 12 months  This is a new start - he is not on another GLP medication currently or in the past 12 months   No personal or family history of thyroid cancer  No personal history of pancreatitis  A GLP medication is recommended for improvement in weight and comorbidities associated with obesity  - see problem list.  Risks and benefits and side effects of medication reviewed  Body mass index is 55.67 kg/m².  Wt Readings from Last 1 Encounters:   12/30/24 (!) 171 kg (377 lb)      Orders:  •  CBC and differential;  Future  •  Comprehensive metabolic panel; Future  •  Lipid panel; Future  •  TSH, 3rd generation; Future  •  T4, free; Future  •  Vitamin D 25 hydroxy; Future    Persistent insomnia  Not well controlled  He has been out of meds   Restart zoloft and Adderall   Orders:  •  CBC and differential; Future  •  Comprehensive metabolic panel; Future  •  Lipid panel; Future  •  TSH, 3rd generation; Future  •  T4, free; Future  •  Vitamin D 25 hydroxy; Future    Subclinical hypothyroidism  Has not been taking stynthroid in over 6 months - update labs before starting meds   Orders:  •  CBC and differential; Future  •  Comprehensive metabolic panel; Future  •  Lipid panel; Future  •  TSH, 3rd generation; Future  •  T4, free; Future  •  Vitamin D 25 hydroxy; Future    Fatty liver  Check LFTs   Work on weight loss   Orders:  •  CBC and differential; Future  •  Comprehensive metabolic panel; Future  •  Lipid panel; Future  •  TSH, 3rd generation; Future  •  T4, free; Future  •  Vitamin D 25 hydroxy; Future    Vitamin D deficiency  Update labs prior to treating again   Orders:  •  CBC and differential; Future  •  Comprehensive metabolic panel; Future  •  Lipid panel; Future  •  TSH, 3rd generation; Future  •  T4, free; Future  •  Vitamin D 25 hydroxy; Future    Attention deficit hyperactivity disorder (ADHD), combined type  Not well controlled  He has been out of meds   Restart zoloft and Adderall   Orders:  •  amphetamine-dextroamphetamine (ADDERALL XR) 15 MG 24 hr capsule; Take 1 capsule (15 mg total) by mouth every morning Max Daily Amount: 15 mg    Intrinsic eczema  Restart elocon   Start desowen for face   Orders:  •  mometasone (ELOCON) 0.1 % ointment; Apply topically daily  •  desonide (DESOWEN) 0.05 % cream; Apply topically 2 (two) times a day To dry itchy area. 2 weeks maximum.    Severe major depressive disorder (HCC)  Not well controlled  He has been out of meds   Restart zoloft and Adderall        Mild intermittent  asthma without complication  Stable        Acute non-recurrent maxillary sinusitis  Symptoms > 10 days   Start augmentin and nasal spray   Orders:  •  amoxicillin-clavulanate (AUGMENTIN) 875-125 mg per tablet; Take 1 tablet by mouth every 12 (twelve) hours for 10 days  •  Triamcinolone Acetonide (Nasacort Allergy 24HR) 55 MCG/ACT nasal spray; 2 sprays by Each Nare route daily         Return in about 3 months (around 3/30/2025) for Annual physical & mood .    Subjective:   Ildefonso is a 34 y.o. male here today for a follow-up on his current medical conditions:    Patient Active Problem List   Diagnosis   • Anxiety   • Class 3 severe obesity due to excess calories with serious comorbidity and body mass index (BMI) of 50.0 to 59.9 in adult (HCC)   • Cavovarus deformity of foot   • Chronic fatigue   • Chronic pain of left ankle   • Circadian rhythm disorder   • Eczema   • Enuresis, nocturnal only   • Incomplete emptying of bladder   • Metatarsalgia   • Persistent insomnia   • Plantarflexion deformity of foot   • Seasonal allergies   • Snoring   • Vitamin D deficiency   • Pure hypercholesterolemia   • MDD (major depressive disorder), recurrent episode, moderate (HCC)   • Mild intermittent asthma without complication   • Subclinical hypothyroidism   • Attention deficit hyperactivity disorder (ADHD), combined type   • Elevated LFTs   • Low testosterone   • Fatty liver   • Severe major depressive disorder (HCC)         Patient Care Team:  Rossi Contreras DO as PCP - General (Family Medicine)    Current Medications:  Current Outpatient Medications   Medication Sig Dispense Refill   • amoxicillin-clavulanate (AUGMENTIN) 875-125 mg per tablet Take 1 tablet by mouth every 12 (twelve) hours for 10 days 20 tablet 0   • amphetamine-dextroamphetamine (ADDERALL XR) 15 MG 24 hr capsule Take 1 capsule (15 mg total) by mouth every morning Max Daily Amount: 15 mg 90 capsule 0   • desonide (DESOWEN) 0.05 % cream Apply topically 2 (two)  times a day To dry itchy area. 2 weeks maximum. 15 g 1   • mometasone (ELOCON) 0.1 % ointment Apply topically daily 45 g 0   • multivitamin (THERAGRAN) TABS Take 1 tablet by mouth daily     • Omega-3 Fatty Acids (FISH OIL PO) Take 3 capsules by mouth daily     • sertraline (ZOLOFT) 100 mg tablet Take 1 tablet (100 mg total) by mouth daily 90 tablet 1   • tirzepatide (Zepbound) 2.5 mg/0.5 mL auto-injector Inject 0.5 mL (2.5 mg total) under the skin once a week for 28 days 2 mL 0   • Triamcinolone Acetonide (Nasacort Allergy 24HR) 55 MCG/ACT nasal spray 2 sprays by Each Nare route daily 16.9 mL 2   • ergocalciferol (VITAMIN D2) 50,000 units Take 1 capsule (50,000 Units total) by mouth once a week After 12 weeks get blood work to determine your next dose. (Patient not taking: Reported on 2024) 12 capsule 0   • levothyroxine (Synthroid) 50 mcg tablet Take 1 tablet (50 mcg total) by mouth daily in the early morning 30 tablet 5     No current facility-administered medications for this visit.       HPI:  Chief Complaint   Patient presents with   • Depression   • ADHD   • Eczema   • sinus pressure     -- Above per clinical staff and reviewed. --    PHQ-2/9 Depression Screening    Little interest or pleasure in doing things: 2 - more than half the days  Feeling down, depressed, or hopeless: 1 - several days  Trouble falling or staying asleep, or sleeping too much: 3 - nearly every day  Feeling tired or having little energy: 3 - nearly every day  Poor appetite or overeatin - more than half the days  Feeling bad about yourself - or that you are a failure or have let yourself or your family down: 0 - not at all  Trouble concentrating on things, such as reading the newspaper or watching television: 3 - nearly every day  Moving or speaking so slowly that other people could have noticed. Or the opposite - being so fidgety or restless that you have been moving around a lot more than usual: 0 - not at all  Thoughts that  you would be better off dead, or of hurting yourself in some way: 0 - not at all  PHQ-9 Score: 14  PHQ-9 Interpretation: Moderate depression            Physical due 1/18/25. Fasting blood work ordered     F/u mood. Last visit zoloft increaed to 100 mg. Start Adderall and zepbound.  Liver US ordered. Refer to sleep medicine.   - labs ordered for may   - PE due 1/18/25    -  liver US 2/2024 hepatic steato  sis   - old records reviewed from [facility] evaluation - dx with ADHD inattentive type. Recommended cousneling for anger  He had repeat thyroid blood work too early.  Repeat TSH and T4 in about 2 months.  TPO antibody negative,  January 2024 blood work Lucio  e testosterone low at 7.8, total testosterone 408.  Refer to urology.  CBC normal, CMP fasting blood sugar 86, AST 58, ALT 74, total bilirubin 1.04, GFR 86, cholesterol 253, triglycerides 141, HDL 45,  TSH 5.762, free T4 normal at 1.01, vitamin D   22.8.      History of  Insomnia  doxepin 50 too sedating. Dx with auditory processing disorder as a child. States he was dx with ADD in past at [facility] but Strattera not covered.. BP was high.   Today:  Sinus infection for a week   Runny nose and congestion, thick mucus yellow   Sinus pressure in cheeks, forehead   Fluid behing ears   No fevers, no sore throat   No sick contacts   Low back pain as usual   Moist cough from pnd   Sudafed 4 -6 hour (did not check it this am)     Not taking thyroid medicine - ran out     Ran out of all meds   Was doing okay while on them   Gets irritable   Not able to concentrate well   Hard to get mundane tasks done   Hyperfocusing on other things         The following portions of the patient's history were reviewed and updated as appropriate: allergies, current medications, past family history, past medical history, past social history, past surgical history and problem list.    Objective:  Vitals:  /86 (BP Location: Left arm, Patient Position: Sitting, Cuff Size: Large)    "Pulse 96   Temp 97.6 °F (36.4 °C) (Tympanic)   Resp 20   Ht 5' 9\" (1.753 m)   Wt (!) 171 kg (377 lb)   SpO2 97%   BMI 55.67 kg/m²    Wt Readings from Last 3 Encounters:   12/30/24 (!) 171 kg (377 lb)   02/20/24 (!) 173 kg (381 lb 3.2 oz)   01/18/24 (!) 179 kg (393 lb 9.6 oz)      BP Readings from Last 3 Encounters:   12/30/24 130/86   02/20/24 124/82   01/18/24 124/90        Review of Systems   He has no other concerns. No unexpected weight changes. No chest pain, SOB, or palpitations. No GERD. No changes in bowels or bladder. Sleeping well. No mood changes.       Physical Exam   Constitutional:  he appears well-developed and well-nourished.  HENT: Head: Normocephalic.   Neck: Neck supple.   Cardiovascular: Normal rate, regular rhythm and normal heart sounds.   Pulmonary/Chest: Effort normal and breath sounds normal. No wheezes, rales, or rhonchi.   Abdominal: Soft. Bowel sounds are normal. There is no tenderness. No hepatosplenomegaly.   Musculoskeletal: he exhibits no edema.   Lymphadenopathy: he has no cervical adenopathy.   Neurological: he is alert and oriented to person, place, and time.   Skin: Skin is warm and dry.   Psychiatric: he has a normal mood and affect. his behavior is normal. Thought content normal.        "

## 2024-12-30 NOTE — ASSESSMENT & PLAN NOTE
Not well controlled  He has been out of meds   Restart zoloft and Adderall   Orders:  •  amphetamine-dextroamphetamine (ADDERALL XR) 15 MG 24 hr capsule; Take 1 capsule (15 mg total) by mouth every morning Max Daily Amount: 15 mg

## 2024-12-30 NOTE — ASSESSMENT & PLAN NOTE
Not well controlled  He has been out of meds   Restart zoloft and Adderall   Orders:  •  CBC and differential; Future  •  Comprehensive metabolic panel; Future  •  Lipid panel; Future  •  TSH, 3rd generation; Future  •  T4, free; Future  •  Vitamin D 25 hydroxy; Future

## 2024-12-30 NOTE — PATIENT INSTRUCTIONS
Eczema tips:  ·         Use moisturizing soap like dove or oil of olay.  ·         Use a thick cream like Eucerin, Aquaphor, Aveeno, CeraVe or Neutrogena. Moisturize every day, 2 -3 times a day is even better. Creams with ingredients like ceramides can help repair the skin barrier.  The thicker the better - thick cream and ointment forms, in a jar, are better. (the store brands will save you money) Few examples:  Cetaphil, CeraVe, Vanicream, Aquaphor, Eucerin, Aveeno.   ·         Best to use fragrance free, sensitive skin products.  ·         Best to use the steroid creams or moisturizers after shower or bath to trap in moisture. Apply while skin still damp.   ·         A humidifier in your bedroom can help in the winter.  ·         Keep baths and showers short - less than 10 minutes.  ·         Use warm water, but not hot water.  ·         Do not use bubble bath.      GoodRx look for coupon for Zepbound or Wegovy       Weight Loss Medications - please read carefully   Saxenda, Wegovy, [x] Zepbound are FDA approved for weight loss with a prior authorization. Call to see if these are approved by your insurance.     We have the additional options of Ozempic or Mounjaro if you have type 2 diabetes only.       [x] How to start the process: please read carefully   Call to see if one of the above medications are covered by your insurance.  Next call around to see if any pharmacies have the medication in stock. We cannot fill it until you know they have it in stock.   Next send us a orderTopia message to let us know which one so we can send it in. The medication will need to be increased either weekly (for Saxenda) or monthly (for the others), so we cannot send it to a mail away pharmacy until you get to the maintenance dose.   Once  the prescription gets sent in we will need to get a prior auth from the insurance company. Allow 1 - 2 weeks for this process. If you have not hear anything regarding approval or denial from our  office by this time, please reach out to us.  Once you start the medication you will need to contact our office every 2 - 3 weeks in order to get the next dose. Let us know your current dose and what pharmacy you want it sent to. You can call or send a [facility] message. Do not send a Medication Refill message, or have the pharmacy contact us,  or the last dose will be refilled automatically. Be sure to give enough time incase your insurance requires prior auth for every new dose (this can take up to 21 days).   We will need to see you about every 3 months to check on how this is working and to communicate with your insurance company that you are doing well. Please keep these scheduled appointments.       [x] Zepbound   Month 1:  2.5 mg subQ once weekly for 4 weeks  Month 2:  * 5 mg weekly x 4 weeks    Month 3:  7.5 mg weekly x 4 weeks   Month 4:  * 10 mg weekly x 4 weeks   Month 5: 12.5 mg weekly x 4 weeks   Month 6:  * 15 mg weekly (max dose/maintenance dose)       How to give it:  Inject subQ into the thigh, abdomen, or upper arm; rotate injection sites 2,1.  Administer at any time of day, with or without meals 2,1.  Administer separately from insulin (do not mix the products); may inject both medications in the same body region but not adjacent to each other 2.  The day of the weekly administration can be changed as long as the time between the 2 doses is at least 3 days (72 hours) 2,1.  Administer a missed dose as soon as possible within 4 days (96 hours) of missed dose; if more than 4 days have passed, skip the missed dose and administer next dose on regularly scheduled day and resume regular once weekly dosing schedule    Call if:  You feel fullness or pain in the thyroid area (front and middle of your neck)    Bad upper abdominal pain that is not going away.   Nausea or vomiting that persists.  Any changes in vision 2.  You have worsening depression, suicidal ideation, or unusual changes in behavior  1.    Other precautions:  It is advised if you use an oral hormonal contraceptive to switch to a non-oral contraceptive method, or add a barrier method of contraception for 4 weeks after initiation and for 4 weeks after each dose escalation 2.  Side effects may include nausea, diarrhea, decreased appetite, vomiting, constipation, dyspepsia, and abdominal pain 2.  Take precautions to avoid dehydration 2.  If you have diabetes monitor for symptoms of hypoglycemia and report difficulties with glycemic control 1.  If you miss a dose administer a missed dose as soon as possible within 4 days. If more than 4 days have passed, skip the missed dose, administer the next dose on the regularly scheduled day, and resume the regular once-weekly dosing schedule 2.      Saxenda  This is how to start Saxenda. It is a daily injection to help with weight loss.     WEEK 1: 0.6mg daily  -if tolerated,  WEEK 2: 1.2mg daily  -if tolerated,  WEEK 3: 1.8mg daily  -if tolerated,  WEEK 4: 2.4mg daily  -if tolerated,  WEEK 5: 3mg daily and then continue at this dose     If you miss more than 3 days of the medication you should restart this from the beginning.     VISIT [email or web address] to see about coupons, how to give yourself injections, and more information     If you develop nausea or vomiting, STOP the medication for a few days, then DECREASE to the previous dose that you tolerated well.   Stay well hydrated.  You can inject in your stomach, upper leg or upper arm   Unused pens should be stored in the refrigerator, but the pen in use can he kept at room temperature for up to 30 days   If you develop severe abdominal pain, pain radiating from your abdomen to the back, or fever associated with abdominal pain/vomiting, please call or go to the ER as this can be a sign of pancreatitis which can be an adverse effect of the medication.    Action: Cause slower gastric emptying which will increase feeling full with meals.  This feeling fullness will  allow for you to reduce calorie intake sooner than usual and begin to lose some weight.  These medications also will reduce production of glucose form your liver to allow for better control of your sugars.   Reminders: These medications do not cause low blood sugars.   Keep in the refrigerator until you use it once, then keep it out of refrigerator.  You ONLY need to prime the pen upon open it monthly.  Clean your hands and site of injection to avoid infection risks. Store used needles in old plastic laundry detergent bin or coffee bin, then tape it when full and discard in garbage.   Side Effects:  Common side effects include full sensation with eating, nausea which usually improves over the first 1-2 weeks. Soreness, redness or lump at site of injection.  Dangers:  Pancreatitis can happen with this medicine for some people; therefore if you have ever had pancreatitis or have severe nausea vomiting and abdominal pain while on this medicine stop it immediately and call us or go to ER for assistance. Do not use if you have had a history of thyroid cancer or a family history of MEN syndrome. If you are diabetic and use this medication with insulin or sulfonylureas there is a risk of low blood sugar. Monitor your sugars more closely.  If you are not able to urinate properly this could be a sign of a kidney problem. Let us know right away. In the clinical trials there were issues with gallbladder problems like gallstones. Let us know if you develop severe abdominal pain. If you have new depression or thoughts of suicide you should also contact us right away, and call 911 or go to your nearest emergency room.         Wegovy   Wegovy dosing:  Week 1 - 4:  0.25 mg weekly   Week 5 - 8:  0.5 mg weekly   Week 9 - 12:  1 mg weekly   Week 13 - 16:  1.7 mg weekly   Maintenance from there:  2.4 mg weekly.     Wegovy is a WEEKLY non-insulin injectable. GLP-1Agonist  Semaglutide (Wegovy) FDA approved in 2014 as adjunct to reduced  calorie diet and increased physical activity for chronic weight management in adults with initial BMI of >= 30 kg/m2 or >= 27 kg/m2 with >= 1 weight-related comorbid condition. There were several studies run over 68 week periods of time.  During this time they diabetic patients lose on average 6.2% of their body weight. In a different trial involving non-diabetics the weight loss was closer to 12 - 15%.          Action: Cause slower gastric emptying which will increase feeling full with meals.  This feeling fullness will allow for you to reduce calorie intake sooner than usual and begin to lose some weight.  These medications also will reduce production of glucose form your liver to allow for better control of your sugars.       Reminders: These medications do not cause low blood sugars.   Keep in the refrigerator until you use it once, then keep it out of refrigerator.  Clean your hands and site of injection to avoid infection risks. Store used needles in old plastic laundry detergent bin or coffee bin, then tape it when full and discard in garbage.      Side Effects:  Common side effects include full sensation with eating, nausea which usually improves over the first 1-2 weeks. Soreness, redness or lump at site of injection. Constipation, stomach pain, headache, fatigue, indigestion, dizziness, bloating, burping.      Dangers:  Pancreatitis can happen with this medicine for some people; therefore if you have ever had pancreatitis or have severe nausea vomiting and abdominal pain while on this medicine stop it immediately and call us or go to ER for assistance. Similarly gallbladder disease.  Do not use if you have had a history of thyroid canceror a family history of MEN syndrome.  It can cause low blood sugar if mixed with certain other diabetes medications.  If you have type 2 diabetes you should already be seeing an eye doctor - let them know you are on this medication and contact them if there are changes in  your vision.  See the package insert for all serious possible side effects        Covered only if you have type 2 diabetes:    Ozempic  How to start Ozempic  Month 1:  0.25 mg weekly x 4 weeks   Month 2:  0.5 mg weekly x 4 weeks   Month 3:  1 mg weekly x 4 weeks   Month 4:  2 mg weekly thereafter     Go to the Ozempic or GoodRx website to look coupons to save on this medication.   About the Ozempic® Pen  Ozempic® (semaglutide) injection 0.5 mg or 1 mg   Ozempic PI ([email or web address]-[email or web address])     Ozempic is a  WEEKLY non-insulin injectable. GLP-1Agonist  Victoza (liraglutide) daily, Byetta (exenatide) twice daily, Adlyxin (lixisenatide)  Trulicity (dulaglutide) Bydureon(Exenatide) , Ozempic (semaglutide), Rybelsus (semaglutide),          Action: Cause slower gastric emptying which will increase feeling full with meals.  This feeling fullness will allow for you to reduce calorie intake sooner than usual and begin to lose some weight.  These medications also will reduce production of glucose form your liver to allow for better control of your sugars.       Reminders: These medications do not cause low blood sugars.   Keep in the refrigerator until you use it once, then keep it out of refrigerator.  Clean your hands and site of injection to avoid infection risks. Store used needles in old plastic laundry detergent bin or coffee bin, then tape it when full and discard in garbage.      Side Effects:  Common side effects include full sensation with eating, nausea which usually improves over the first 1-2 weeks. Soreness, redness or lump at site of injection.     Dangers:  Pancreatitis can happen with this medicine for some people; therefore if you have ever had pancreatitis or have severe nausea vomiting and abdominal pain while on this medicine stop it immediately and call us or go to ER for assistance. Do not use if you have had a history of thyroid canceror a family history of MEN syndrome.     Mounjarno   Mounjaro (tirzepatide)  dosing instructions  Month 1 -  2.5 mg weekly x 4 weeks   Month 2 - 5 mg weekly x 4 weeks  Month 3 - 7.5 mg weekly x 4 week   Month 4 - 10 mg weekly x 4 weeks  Month 5 - 12.5 mg weekly x 4 weeks   Week 6 and thereafter - 15 mg weekly     You can inject this in your stomach, thigh, or upper arm.   For savings card program go to svh24.[email or web address] or  [id number] ([phone])  Common side effects  nausea, diarrhea, decreased appetite, vomiting, constipation, indigestion, and stomach pain.   To help these try eating smaller meals, stop eating when you feel full, avoid eating fat or fatty foods, try eating bland foods like toast, crackers or rice.   If you take birth control pills by mouth these may not work as well while you are on this medication and you may want to increase your dose or use another form of birth control.     Contraindicated with medullary thyroid carcinoma MTC or MEN2.   It may cause tumors of the thyroid, including thyroid cancer.   Call right away and stop medication if severe pain in stomach with or without vomiting (pancreatitis)   Watch for low blood sugar if taking this medication with a sulfanuria or insulin.   Drink plenty of fluids to avoid dehydration.   If you having gallbladder issues such as pain in your upper abdomen, yellowing of your skin, fever, ana colored stools, stop this medicine can all your doctor.   Call if you have sudden change in your vision.

## 2024-12-30 NOTE — ASSESSMENT & PLAN NOTE
- will start zepbound   Recommend Zepbound (could also consider Wegovy and Saxenda)   Pt has not had success with weight loss with healthy diet and exercise alone, but will continue to work on these.     Patient is to call her insurance to see if this is covered, and then call pharmacies to find the medication.  When he does this we can submit the medication for prior authorization.  Follow up 3 months after starting GLP.    Patient has not been on any weight loss medicines in the past 12 months  This is a new start - he is not on another GLP medication currently or in the past 12 months   No personal or family history of thyroid cancer  No personal history of pancreatitis  A GLP medication is recommended for improvement in weight and comorbidities associated with obesity  - see problem list.  Risks and benefits and side effects of medication reviewed  Body mass index is 55.67 kg/m².  Wt Readings from Last 1 Encounters:   12/30/24 (!) 171 kg (377 lb)      Orders:  •  CBC and differential; Future  •  Comprehensive metabolic panel; Future  •  Lipid panel; Future  •  TSH, 3rd generation; Future  •  T4, free; Future  •  Vitamin D 25 hydroxy; Future

## 2024-12-30 NOTE — ASSESSMENT & PLAN NOTE
Has not been taking stynthroid in over 6 months - update labs before starting meds   Orders:  •  CBC and differential; Future  •  Comprehensive metabolic panel; Future  •  Lipid panel; Future  •  TSH, 3rd generation; Future  •  T4, free; Future  •  Vitamin D 25 hydroxy; Future

## 2024-12-30 NOTE — ASSESSMENT & PLAN NOTE
Not well controlled  He has been out of meds   Restart zoloft and Adderall   Orders:  •  sertraline (ZOLOFT) 100 mg tablet; Take 1 tablet (100 mg total) by mouth daily  •  CBC and differential; Future  •  Comprehensive metabolic panel; Future  •  Lipid panel; Future  •  TSH, 3rd generation; Future  •  T4, free; Future  •  Vitamin D 25 hydroxy; Future

## 2024-12-31 DIAGNOSIS — E66.01 CLASS 3 SEVERE OBESITY DUE TO EXCESS CALORIES WITH SERIOUS COMORBIDITY AND BODY MASS INDEX (BMI) OF 50.0 TO 59.9 IN ADULT (HCC): ICD-10-CM

## 2024-12-31 DIAGNOSIS — E66.813 CLASS 3 SEVERE OBESITY DUE TO EXCESS CALORIES WITH SERIOUS COMORBIDITY AND BODY MASS INDEX (BMI) OF 50.0 TO 59.9 IN ADULT (HCC): ICD-10-CM

## 2024-12-31 RX ORDER — TIRZEPATIDE 2.5 MG/.5ML
2.5 INJECTION, SOLUTION SUBCUTANEOUS WEEKLY
Qty: 2 ML | Refills: 0 | Status: SHIPPED | OUTPATIENT
Start: 2024-12-31 | End: 2025-01-28

## 2024-12-31 NOTE — TELEPHONE ENCOUNTER
Please advise sending Zepbound Rx to [facility] as patient's pharmacy does not have this dose in stock.

## 2024-12-31 NOTE — TELEPHONE ENCOUNTER
PT states the [facility] pharmacy does not have the zepbound in stock, and he is hoping to start it tomorrow. He is requesting the zepbound to please be re-routed to the [facility].    Please advise    ThankYou

## 2024-12-31 NOTE — TELEPHONE ENCOUNTER
PA Zepbound 2.5 MG/0.5ML APPROVED         Patient advised by          []MyChart Message  []Phone call   [x]LMOM  []L/M to call office as no active Communication consent on file  []Unable to leave detailed message as VM not approved on Communication consent       Pharmacy advised by    [x]Fax  []Phone call

## 2025-01-06 ENCOUNTER — TELEPHONE (OUTPATIENT)
Dept: FAMILY MEDICINE CLINIC | Facility: CLINIC | Age: 35
End: 2025-01-06

## 2025-01-06 ENCOUNTER — PATIENT MESSAGE (OUTPATIENT)
Dept: FAMILY MEDICINE CLINIC | Facility: CLINIC | Age: 35
End: 2025-01-06

## 2025-01-06 NOTE — PATIENT COMMUNICATION
Please initiate prior authorization Zepbound 2.5 mg.   [FreeTextEntry1] : Impression\par \par Right flank discomfort and tenderness and some right anterior abdominal discomfort\par \par Recent December 1 CAT scan as renal stone hunt was negative\par \par Urinalysis and urine culture at that time was negative\par \par GERD with bitter taste that responds to famotidine\par \par Suggest\par \par Antireflux diet and renewal of famotidine\par \par Get a CAT scan with IV and oral contrast of the abdomen and pelvis\par \par Lab work\par \par Urine culture and urinalysis to be repeated

## 2025-01-07 NOTE — PATIENT COMMUNICATION
Duplicate encounter created, please see telephone encounter from 12/30/24 regarding Zepbound PA status. Please review patient's chart to see if there is already an encounter regarding the medication in question and to document anything regarding this medication in regards to anything regarding the authorization process etc before creating another encounter Thank You.    Message was left on patient's machine that it was approved.

## 2025-01-29 ENCOUNTER — PATIENT MESSAGE (OUTPATIENT)
Dept: FAMILY MEDICINE CLINIC | Facility: CLINIC | Age: 35
End: 2025-01-29

## 2025-01-29 DIAGNOSIS — E66.01 CLASS 3 SEVERE OBESITY DUE TO EXCESS CALORIES WITH SERIOUS COMORBIDITY AND BODY MASS INDEX (BMI) OF 50.0 TO 59.9 IN ADULT (HCC): Primary | ICD-10-CM

## 2025-01-29 DIAGNOSIS — E66.813 CLASS 3 SEVERE OBESITY DUE TO EXCESS CALORIES WITH SERIOUS COMORBIDITY AND BODY MASS INDEX (BMI) OF 50.0 TO 59.9 IN ADULT (HCC): Primary | ICD-10-CM

## 2025-02-04 RX ORDER — TIRZEPATIDE 5 MG/.5ML
5 INJECTION, SOLUTION SUBCUTANEOUS WEEKLY
Qty: 2 ML | Refills: 0 | Status: SHIPPED | OUTPATIENT
Start: 2025-02-04

## 2025-02-28 ENCOUNTER — PATIENT MESSAGE (OUTPATIENT)
Dept: FAMILY MEDICINE CLINIC | Facility: CLINIC | Age: 35
End: 2025-02-28

## 2025-02-28 DIAGNOSIS — E66.01 CLASS 3 SEVERE OBESITY DUE TO EXCESS CALORIES WITH SERIOUS COMORBIDITY AND BODY MASS INDEX (BMI) OF 50.0 TO 59.9 IN ADULT (HCC): ICD-10-CM

## 2025-02-28 DIAGNOSIS — E66.813 CLASS 3 SEVERE OBESITY DUE TO EXCESS CALORIES WITH SERIOUS COMORBIDITY AND BODY MASS INDEX (BMI) OF 50.0 TO 59.9 IN ADULT (HCC): ICD-10-CM

## 2025-02-28 DIAGNOSIS — E66.813 CLASS 3 SEVERE OBESITY DUE TO EXCESS CALORIES WITH SERIOUS COMORBIDITY AND BODY MASS INDEX (BMI) OF 50.0 TO 59.9 IN ADULT (HCC): Primary | ICD-10-CM

## 2025-02-28 DIAGNOSIS — E66.01 CLASS 3 SEVERE OBESITY DUE TO EXCESS CALORIES WITH SERIOUS COMORBIDITY AND BODY MASS INDEX (BMI) OF 50.0 TO 59.9 IN ADULT (HCC): Primary | ICD-10-CM

## 2025-02-28 RX ORDER — TIRZEPATIDE 7.5 MG/.5ML
7.5 INJECTION, SOLUTION SUBCUTANEOUS WEEKLY
Qty: 2 ML | Refills: 0 | Status: SHIPPED | OUTPATIENT
Start: 2025-02-28 | End: 2025-03-03 | Stop reason: SDUPTHER

## 2025-03-03 ENCOUNTER — TELEPHONE (OUTPATIENT)
Age: 35
End: 2025-03-03

## 2025-03-03 RX ORDER — TIRZEPATIDE 7.5 MG/.5ML
7.5 INJECTION, SOLUTION SUBCUTANEOUS WEEKLY
Qty: 2 ML | Refills: 0 | Status: SHIPPED | OUTPATIENT
Start: 2025-03-03

## 2025-03-03 NOTE — TELEPHONE ENCOUNTER
PA Zepbound 7.5 mg/0.5 mL SUBMITTED     to [facility]     via    []CMM-KEY:    [x]Surescripts-Case ID # [id number]   []Availity-Auth ID #  NDC #    []Faxed to plan   []Other website    []Phone call Case ID #      []PA sent as URGENT    All office notes, labs and other pertaining documents and studies sent. Clinical questions answered. Awaiting determination from insurance company.     Turnaround time for your insurance to make a decision on your Prior Authorization can take 7-21 business days.

## 2025-03-05 DIAGNOSIS — E03.9 ACQUIRED HYPOTHYROIDISM: ICD-10-CM

## 2025-03-06 RX ORDER — LEVOTHYROXINE SODIUM 50 UG/1
50 TABLET ORAL
Qty: 90 TABLET | Refills: 1 | Status: SHIPPED | OUTPATIENT
Start: 2025-03-06

## 2025-04-07 ENCOUNTER — APPOINTMENT (OUTPATIENT)
Dept: LAB | Facility: CLINIC | Age: 35
End: 2025-04-07
Payer: COMMERCIAL

## 2025-04-07 ENCOUNTER — RESULTS FOLLOW-UP (OUTPATIENT)
Dept: FAMILY MEDICINE CLINIC | Facility: CLINIC | Age: 35
End: 2025-04-07

## 2025-04-07 ENCOUNTER — OFFICE VISIT (OUTPATIENT)
Dept: FAMILY MEDICINE CLINIC | Facility: CLINIC | Age: 35
End: 2025-04-07
Payer: COMMERCIAL

## 2025-04-07 VITALS
HEART RATE: 90 BPM | WEIGHT: 315 LBS | HEIGHT: 69 IN | BODY MASS INDEX: 46.65 KG/M2 | DIASTOLIC BLOOD PRESSURE: 96 MMHG | TEMPERATURE: 95.5 F | OXYGEN SATURATION: 97 % | SYSTOLIC BLOOD PRESSURE: 128 MMHG

## 2025-04-07 DIAGNOSIS — E03.9 ACQUIRED HYPOTHYROIDISM: ICD-10-CM

## 2025-04-07 DIAGNOSIS — Z00.00 WELL ADULT EXAM: Primary | ICD-10-CM

## 2025-04-07 DIAGNOSIS — E55.9 VITAMIN D DEFICIENCY: ICD-10-CM

## 2025-04-07 DIAGNOSIS — F90.2 ATTENTION DEFICIT HYPERACTIVITY DISORDER (ADHD), COMBINED TYPE: ICD-10-CM

## 2025-04-07 DIAGNOSIS — J45.20 MILD INTERMITTENT ASTHMA WITHOUT COMPLICATION: ICD-10-CM

## 2025-04-07 DIAGNOSIS — F41.9 ANXIETY: Chronic | ICD-10-CM

## 2025-04-07 DIAGNOSIS — E66.813 CLASS 3 SEVERE OBESITY DUE TO EXCESS CALORIES WITH SERIOUS COMORBIDITY AND BODY MASS INDEX (BMI) OF 50.0 TO 59.9 IN ADULT: ICD-10-CM

## 2025-04-07 DIAGNOSIS — R06.83 SNORING: ICD-10-CM

## 2025-04-07 DIAGNOSIS — F33.1 MDD (MAJOR DEPRESSIVE DISORDER), RECURRENT EPISODE, MODERATE (HCC): ICD-10-CM

## 2025-04-07 DIAGNOSIS — E03.9 ACQUIRED HYPOTHYROIDISM: Primary | ICD-10-CM

## 2025-04-07 PROBLEM — F32.2 SEVERE MAJOR DEPRESSIVE DISORDER (HCC): Status: RESOLVED | Noted: 2024-12-30 | Resolved: 2025-04-07

## 2025-04-07 LAB — TSH SERPL DL<=0.05 MIU/L-ACNC: 9.92 UIU/ML (ref 0.45–4.5)

## 2025-04-07 PROCEDURE — 84443 ASSAY THYROID STIM HORMONE: CPT

## 2025-04-07 PROCEDURE — 36415 COLL VENOUS BLD VENIPUNCTURE: CPT

## 2025-04-07 PROCEDURE — 99395 PREV VISIT EST AGE 18-39: CPT | Performed by: FAMILY MEDICINE

## 2025-04-07 PROCEDURE — 99214 OFFICE O/P EST MOD 30 MIN: CPT | Performed by: FAMILY MEDICINE

## 2025-04-07 RX ORDER — LEVOTHYROXINE SODIUM 100 UG/1
100 TABLET ORAL DAILY
Qty: 90 TABLET | Refills: 1 | Status: SHIPPED | OUTPATIENT
Start: 2025-04-07

## 2025-04-07 RX ORDER — TIRZEPATIDE 10 MG/.5ML
10 INJECTION, SOLUTION SUBCUTANEOUS WEEKLY
Qty: 2 ML | Refills: 2 | Status: SHIPPED | OUTPATIENT
Start: 2025-04-07

## 2025-04-07 RX ORDER — DEXTROAMPHETAMINE SACCHARATE, AMPHETAMINE ASPARTATE MONOHYDRATE, DEXTROAMPHETAMINE SULFATE AND AMPHETAMINE SULFATE 5; 5; 5; 5 MG/1; MG/1; MG/1; MG/1
20 CAPSULE, EXTENDED RELEASE ORAL EVERY MORNING
Qty: 30 CAPSULE | Refills: 0 | Status: SHIPPED | OUTPATIENT
Start: 2025-04-07

## 2025-04-07 NOTE — ASSESSMENT & PLAN NOTE
Well-controlled on last blood work.  Continue with over-the-counter vitamin D daily.  Orders:  •  Cholecalciferol (VITAMIN D3) 1,000 units tablet; Take 1 tablet (1,000 Units total) by mouth daily Over the counter

## 2025-04-07 NOTE — ASSESSMENT & PLAN NOTE
Orders:  •  tirzepatide (Zepbound) 10 mg/0.5 mL auto-injector; Inject 0.5 mL (10 mg total) under the skin once a week  Patient doing well on Zepbound  Jan 2024 393   Dec 2025 377   4/7/2025 349   Weight loss 28 pounds, 7.4%     Averaging 2.5 pounds per week   Encouraged him to start tracking his protein intake and eat more frequently.  He needs his goal should be to eat at least 158 g of protein per day.

## 2025-04-07 NOTE — ASSESSMENT & PLAN NOTE
Depression Screening Follow-up Plan: Patient's depression screening was positive with a PHQ-9 score of 11.  See below   Patient feels Zoloft 100 mg daily is helpful.  Does not feel he needs a change in dose.

## 2025-04-07 NOTE — PROGRESS NOTES
Adult Annual Physical  Name: Ildefonso Blackmon      : 1990      MRN: [id number]  Encounter Provider: Rossi Contreras DO  Encounter Date: 2025   Encounter department: [facility]       Assessment/Plan:     Assessment & Plan  Well adult exam  See below       Vitamin D deficiency  Well-controlled on last blood work.  Continue with over-the-counter vitamin D daily.  Orders:  •  Cholecalciferol (VITAMIN D3) 1,000 units tablet; Take 1 tablet (1,000 Units total) by mouth daily Over the counter    Mild intermittent asthma without complication  No changes       Snoring  Mild sleep apnea noted on sleep study from years ago.  Will repeat test.  Orders:  •  Ambulatory Referral to Sleep Medicine; Future    Class 3 severe obesity due to excess calories with serious comorbidity and body mass index (BMI) of 50.0 to 59.9 in adult (HCC)    Orders:  •  tirzepatide (Zepbound) 10 mg/0.5 mL auto-injector; Inject 0.5 mL (10 mg total) under the skin once a week  Patient doing well on Zepbound  2024 393   Dec 2025 377   2025 349   Weight loss 28 pounds, 7.4%     Averaging 2.5 pounds per week   Encouraged him to start tracking his protein intake and eat more frequently.  He needs his goal should be to eat at least 158 g of protein per day.  Attention deficit hyperactivity disorder (ADHD), combined type  He feels that Adderall is helping but symptoms are not at goal  We will increase Adderall from 15 up to 20 mg daily.  Follow-up in about 4 weeks.  [facility]  Can consider further increase in dose or switching medications.  -He has not been on other medications in the past.  Strattera was prescribed but was not covered.  Orders:  •  amphetamine-dextroamphetamine (ADDERALL XR, 20MG,) 20 MG 24 hr capsule; Take 1 capsule (20 mg total) by mouth every morning Max Daily Amount: 20 mg    Anxiety  Patient feels Zoloft 100 mg daily is helpful.  Does not feel he needs a change  in dose.       MDD (major depressive disorder), recurrent episode, moderate (HCC)  Depression Screening Follow-up Plan: Patient's depression screening was positive with a PHQ-9 score of 11.  See below   Patient feels Zoloft 100 mg daily is helpful.  Does not feel he needs a change in dose.       Acquired hypothyroidism  Patient having repeat TSH level today.  [facility] refill in to [facility] based on the results.          Watch blood pressure.     Well adult exam  ·         Continue healthy diet   ·         Encourage exercise 4 times a week or more for minimum 30 minutes.   ·         Continue to see dentist, wear seatbelt.  ·         Health maintenance reviewed and up-to-date  Reviewed age appropriate health maintenance screenings and immunizations that are due, risks and benefits of these.   Health Maintenance   Topic Date Due   • Annual Physical  01/18/2025   • Influenza Vaccine (1) 06/30/2025 (Originally 9/1/2024)   • COVID-19 Vaccine (1 - 2024-25 season) 07/07/2025 (Originally 9/1/2024)   • Pneumococcal Vaccine: Pediatrics (0 to 5 Years) and At-Risk Patients (6 to 64 Years) (1 of 2 - PCV) 04/07/2026 (Originally 4/3/2009)   • Depression Screening  12/30/2025   • Depression Follow-up Plan  12/30/2025   • DTaP,Tdap,and Td Vaccines (2 - Td or Tdap) 01/18/2034   • Zoster Vaccine (1 of 2) 04/03/2040   • HIV Screening  Completed   • Hepatitis C Screening  Completed   • Meningococcal B Vaccine  Aged Out   • RSV Vaccine age 0-20 Months  Aged Out   • HIB Vaccine  Aged Out   • IPV Vaccine  Aged Out   • Hepatitis A Vaccine  Aged Out   • Meningococcal ACWY Vaccine  Aged Out   • HPV Vaccine  Aged Out     Preventive Screenings:  - Diabetes Screening: screening up-to-date  - Cholesterol Screening: screening not indicated and has hyperlipidemia   - Hepatitis C screening: screening up-to-date   - HIV screening: screening up-to-date   - Lung cancer screening: screening not indicated   - Prostate cancer screening: screening not indicated      Return in about 1 month (around 5/7/2025) for mood check - virtual .    Subjective:    SHASHANK Fregoso is a 35 y.o. male who presents today for a physical.     Chief Complaint   Patient presents with   • Physical Exam       Patient Care Team:  Rossi Contreras DO as PCP - General (Family Medicine)    ---Above per clinical staff & reviewed. ---  History of Present Illness     Patient here today for a physical:  Adult Annual Physical    Diet: balanced. Smaller portions. Focusing on protein.   Exercise:  just at work  Dental visits:  due   Sleep: 6 hours or less half the week, poor quality    Concerns today:    Not tracking protein   Eats 11:30 meat and cheese = eating once a day     Zepbound started mid January 363 at home, now 328.4   Jan 2024 393   Dec 2025 377   4/7/2025 349   Weight loss 28 pounds, 7.4%     Averaging 2.5 pounds per week     Adderall helpful but still having times when he is waiting for appointments. Putting off projects, not getting things done.   Not feeling a     Acid reflux side effects   Better when he gets injection in thigh         Takes him 3 -4 hours to wind down   Stays asleep once he is asleep   Not rested         The following portions of the patient's history were reviewed and updated as appropriate: allergies, current medications, past family history, past medical history, past social history, past surgical history and problem list.     Current Medications:  Current Outpatient Medications   Medication Sig Dispense Refill   • amphetamine-dextroamphetamine (ADDERALL XR, 20MG,) 20 MG 24 hr capsule Take 1 capsule (20 mg total) by mouth every morning Max Daily Amount: 20 mg 30 capsule 0   • Cholecalciferol (VITAMIN D3) 1,000 units tablet Take 1 tablet (1,000 Units total) by mouth daily Over the counter     • desonide (DESOWEN) 0.05 % cream Apply topically 2 (two) times a day To dry itchy area. 2 weeks maximum. 15 g 1   • levothyroxine 50 mcg tablet TAKE 1 TABLET (50 MCG TOTAL) BY MOUTH  "DAILY IN THE EARLY MORNING 90 tablet 1   • mometasone (ELOCON) 0.1 % ointment Apply topically daily 45 g 0   • multivitamin (THERAGRAN) TABS Take 1 tablet by mouth daily     • Omega-3 Fatty Acids (FISH OIL PO) Take 3 capsules by mouth daily     • sertraline (ZOLOFT) 100 mg tablet Take 1 tablet (100 mg total) by mouth daily 90 tablet 1   • tirzepatide (Zepbound) 10 mg/0.5 mL auto-injector Inject 0.5 mL (10 mg total) under the skin once a week 2 mL 2   • Triamcinolone Acetonide (Nasacort Allergy 24HR) 55 MCG/ACT nasal spray 2 sprays by Each Nare route daily 16.9 mL 2     No current facility-administered medications for this visit.        Objective:      /96 (Patient Position: Sitting, Cuff Size: Large)   Pulse 90   Temp (!) 95.5 °F (35.3 °C) (Temporal)   Ht 5' 9\" (1.753 m)   Wt (!) 158 kg (349 lb)   SpO2 97%   BMI 51.54 kg/m²     BP Readings from Last 3 Encounters:   25 128/96   24 130/86   24 124/82     Wt Readings from Last 3 Encounters:   25 (!) 158 kg (349 lb)   24 (!) 171 kg (377 lb)   24 (!) 173 kg (381 lb 3.2 oz)       PHQ-2/9 Depression Screening    Little interest or pleasure in doing things: 2 - more than half the days  Feeling down, depressed, or hopeless: 1 - several days  Trouble falling or staying asleep, or sleeping too much: 3 - nearly every day  Feeling tired or having little energy: 1 - several days  Poor appetite or overeatin - several days  Feeling bad about yourself - or that you are a failure or have let yourself or your family down: 1 - several days  Trouble concentrating on things, such as reading the newspaper or watching television: 2 - more than half the days  Moving or speaking so slowly that other people could have noticed. Or the opposite - being so fidgety or restless that you have been moving around a lot more than usual: 0 - not at all  Thoughts that you would be better off dead, or of hurting yourself in some way: 0 - not at " all  PHQ-9 Score: 11  PHQ-9 Interpretation: Moderate depression        KEVIN-7 Flowsheet Screening    Flowsheet Row Most Recent Value   Over the last two weeks, how often have you been bothered by the following problems?     Feeling nervous, anxious, or on edge 1   Not being able to stop or control worrying 0   Worrying too much about different things 0   Trouble relaxing  2   Being so restless that it's hard to sit still 0   Becoming easily annoyed or irritable  1   Feeling afraid as if something awful might happen 0   How difficult have these problems made it for you to do your work, take care of things at home, or get along with other people?  Not difficult at all   KEVIN Score  4          Physical Exam   Constitutional: he appears well-developed and well-nourished.   HENT: Head: Normocephalic.   Right Ear: External ear normal. Tympanic membrane normal.   Left Ear: External ear normal. Tympanic membrane normal.   Nose: Nose normal. No mucosal edema, No rhinorrhea.   Right sinus exhibits no maxillary sinus tenderness.   Left sinus exhibits no maxillary sinus tenderness.   Mouth/Throat: Oropharynx is clear and moist.   Eyes: Normal conjunctiva.  No erythema. No discharge.  Neck: No pain on exam. Neck supple.   Cardiovascular: Normal rate, regular rhythm and normal heart sounds.    Pulmonary/Chest: Effort normal and breath sounds normal. No wheezes. No rales. No rhonchi.   Abdominal: Soft. Bowel sounds are normal. There is no tenderness.   Musculoskeletal: he exhibits no edema.   Lymphadenopathy: he has no cervical adenopathy.   Neurological: he  is alert and oriented to person, place, and time.   Skin: Skin is warm and dry. No rashes.  Psychiatric: he  has a normal mood and affect. his behavior is normal. Thought content normal.   Vitals reviewed.            Review of Systems  ROS:  all others negative - no chest pain, SOB, normal urine and bowels. no GERD. sleeping well. mood as above        Objective   /96  "(Patient Position: Sitting, Cuff Size: Large)   Pulse 90   Temp (!) 95.5 °F (35.3 °C) (Temporal)   Ht 5' 9\" (1.753 m)   Wt (!) 158 kg (349 lb)   SpO2 97%   BMI 51.54 kg/m²               "

## 2025-04-07 NOTE — ASSESSMENT & PLAN NOTE
He feels that Adderall is helping but symptoms are not at goal  We will increase Adderall from 15 up to 20 mg daily.  Follow-up in about 4 weeks.  He may check in sooner via [facility].  Can consider further increase in dose or switching medications.  -He has not been on other medications in the past.  Strattera was prescribed but was not covered.  Orders:  •  amphetamine-dextroamphetamine (ADDERALL XR, 20MG,) 20 MG 24 hr capsule; Take 1 capsule (20 mg total) by mouth every morning Max Daily Amount: 20 mg

## 2025-04-07 NOTE — ASSESSMENT & PLAN NOTE
Mild sleep apnea noted on sleep study from years ago.  Will repeat test.  Orders:  •  Ambulatory Referral to Sleep Medicine; Future

## 2025-04-08 ENCOUNTER — TELEPHONE (OUTPATIENT)
Age: 35
End: 2025-04-08

## 2025-04-08 NOTE — TELEPHONE ENCOUNTER
PA for ZEPBOUND 10MG SUBMITTED to [facility]    via    [x]91 [employer] ID #     [x]PA sent as URGENT    All office notes, labs and other pertaining documents and studies sent. Clinical questions answered. Awaiting determination from insurance company.     Turnaround time for your insurance to make a decision on your Prior Authorization can take 7-21 business days.

## 2025-04-10 NOTE — TELEPHONE ENCOUNTER
PA for ZEPBOUND 10MG APPROVED     Date(s) approved         Patient advised by          [x]MyChart Message  []Phone call   []LMOM  []L/M to call office as no active Communication consent on file  []Unable to leave detailed message as VM not approved on Communication consent       Pharmacy advised by    [x]Fax  []Phone call  []Secure Chat

## 2025-04-14 ENCOUNTER — TELEPHONE (OUTPATIENT)
Dept: SLEEP CENTER | Facility: CLINIC | Age: 35
End: 2025-04-14

## 2025-04-14 NOTE — TELEPHONE ENCOUNTER
Referral placed for a home sleep study. Note does not reflect discussion of symptoms listed on order.    Please addend note with specific discussion of symptoms.  --Snoring     Ordering and co-signing providers notified.

## 2025-04-15 ENCOUNTER — TRANSCRIBE ORDERS (OUTPATIENT)
Dept: SLEEP CENTER | Facility: CLINIC | Age: 35
End: 2025-04-15

## 2025-04-15 DIAGNOSIS — R06.83 SNORING: Primary | ICD-10-CM

## 2025-04-26 DIAGNOSIS — L20.84 INTRINSIC ECZEMA: ICD-10-CM

## 2025-05-02 RX ORDER — MOMETASONE FUROATE 1 MG/G
OINTMENT TOPICAL
Qty: 45 G | Refills: 0 | Status: SHIPPED | OUTPATIENT
Start: 2025-05-02

## 2025-05-08 PROBLEM — E03.9 ACQUIRED HYPOTHYROIDISM: Status: ACTIVE | Noted: 2024-01-18

## 2025-05-09 ENCOUNTER — TELEPHONE (OUTPATIENT)
Dept: FAMILY MEDICINE CLINIC | Facility: CLINIC | Age: 35
End: 2025-05-09

## 2025-06-02 DIAGNOSIS — F90.2 ATTENTION DEFICIT HYPERACTIVITY DISORDER (ADHD), COMBINED TYPE: ICD-10-CM

## 2025-06-12 ENCOUNTER — PATIENT MESSAGE (OUTPATIENT)
Dept: FAMILY MEDICINE CLINIC | Facility: CLINIC | Age: 35
End: 2025-06-12

## 2025-06-12 RX ORDER — DEXTROAMPHETAMINE SACCHARATE, AMPHETAMINE ASPARTATE MONOHYDRATE, DEXTROAMPHETAMINE SULFATE AND AMPHETAMINE SULFATE 5; 5; 5; 5 MG/1; MG/1; MG/1; MG/1
20 CAPSULE, EXTENDED RELEASE ORAL EVERY MORNING
Qty: 30 CAPSULE | Refills: 0 | Status: SHIPPED | OUTPATIENT
Start: 2025-06-12

## 2025-07-08 ENCOUNTER — OFFICE VISIT (OUTPATIENT)
Dept: FAMILY MEDICINE CLINIC | Facility: CLINIC | Age: 35
End: 2025-07-08
Payer: COMMERCIAL

## 2025-07-08 VITALS
WEIGHT: 315 LBS | TEMPERATURE: 97.8 F | DIASTOLIC BLOOD PRESSURE: 78 MMHG | HEIGHT: 69 IN | RESPIRATION RATE: 18 BRPM | OXYGEN SATURATION: 99 % | BODY MASS INDEX: 46.65 KG/M2 | SYSTOLIC BLOOD PRESSURE: 128 MMHG | HEART RATE: 102 BPM

## 2025-07-08 DIAGNOSIS — E03.9 ACQUIRED HYPOTHYROIDISM: ICD-10-CM

## 2025-07-08 DIAGNOSIS — F90.2 ATTENTION DEFICIT HYPERACTIVITY DISORDER (ADHD), COMBINED TYPE: Primary | ICD-10-CM

## 2025-07-08 DIAGNOSIS — F33.1 MDD (MAJOR DEPRESSIVE DISORDER), RECURRENT EPISODE, MODERATE (HCC): ICD-10-CM

## 2025-07-08 DIAGNOSIS — E66.813 CLASS 3 SEVERE OBESITY DUE TO EXCESS CALORIES WITH SERIOUS COMORBIDITY AND BODY MASS INDEX (BMI) OF 50.0 TO 59.9 IN ADULT: ICD-10-CM

## 2025-07-08 DIAGNOSIS — E55.9 VITAMIN D DEFICIENCY: ICD-10-CM

## 2025-07-08 DIAGNOSIS — L20.84 INTRINSIC ECZEMA: ICD-10-CM

## 2025-07-08 DIAGNOSIS — E78.00 PURE HYPERCHOLESTEROLEMIA: ICD-10-CM

## 2025-07-08 DIAGNOSIS — Z79.899 ENCOUNTER FOR LONG-TERM (CURRENT) USE OF MEDICATIONS: ICD-10-CM

## 2025-07-08 DIAGNOSIS — F41.9 ANXIETY: Chronic | ICD-10-CM

## 2025-07-08 PROCEDURE — 99214 OFFICE O/P EST MOD 30 MIN: CPT | Performed by: FAMILY MEDICINE

## 2025-07-08 RX ORDER — SERTRALINE HYDROCHLORIDE 100 MG/1
100 TABLET, FILM COATED ORAL DAILY
Qty: 90 TABLET | Refills: 2 | Status: SHIPPED | OUTPATIENT
Start: 2025-07-08

## 2025-07-08 RX ORDER — DEXTROAMPHETAMINE SACCHARATE, AMPHETAMINE ASPARTATE MONOHYDRATE, DEXTROAMPHETAMINE SULFATE AND AMPHETAMINE SULFATE 5; 5; 5; 5 MG/1; MG/1; MG/1; MG/1
20 CAPSULE, EXTENDED RELEASE ORAL EVERY MORNING
Qty: 30 CAPSULE | Refills: 0 | Status: SHIPPED | OUTPATIENT
Start: 2025-07-08

## 2025-07-08 RX ORDER — TIRZEPATIDE 2.5 MG/.5ML
2.5 INJECTION, SOLUTION SUBCUTANEOUS WEEKLY
Qty: 2 ML | Refills: 0 | Status: SHIPPED | OUTPATIENT
Start: 2025-07-08 | End: 2025-08-05

## 2025-07-08 RX ORDER — MOMETASONE FUROATE 1 MG/G
OINTMENT TOPICAL 2 TIMES DAILY PRN
Qty: 45 G | Refills: 2 | Status: SHIPPED | OUTPATIENT
Start: 2025-07-08

## 2025-07-08 RX ORDER — LEVOTHYROXINE SODIUM 100 UG/1
100 TABLET ORAL DAILY
Qty: 90 TABLET | Refills: 2 | Status: SHIPPED | OUTPATIENT
Start: 2025-07-08

## 2025-07-08 NOTE — PROGRESS NOTES
Follow up visit   Name: Ildefonso Blackmon      : 1990      MRN: [id number]  Encounter Provider: Rossi Contreras DO  Encounter Date: 2025   Encounter department: [facility]    :  Assessment & Plan  Attention deficit hyperactivity disorder (ADHD), combined type    -He has not been on other medications in the past.  Strattera was prescribed but was not covered.  - tolerating Adderall XR 20 mg daily well.   Controlled Substance Review    [place] or [place]  reviewed: No red flags were identified; safe to proceed with prescription..    Orders:  •  amphetamine-dextroamphetamine (ADDERALL XR, 20MG,) 20 MG 24 hr capsule; Take 1 capsule (20 mg total) by mouth every morning Max Daily Amount: 20 mg     Acquired hypothyroidism  Patient having repeat TSH level today.  Send [facility]ill in to [facility] based on the results.    Orders:  •  TSH, 3rd generation with Free T4 reflex  •  levothyroxine (Synthroid) 100 mcg tablet; Take 1 tablet (100 mcg total) by mouth daily     Class 3 severe obesity due to excess calories with serious comorbidity and body mass index (BMI) of 50.0 to 59.9 in adult    Patient doing well on Zepbound  2024 393   Dec 2025 377   2025 349   Weight loss 28 pounds, 7.4%     Averaging 2.5 pounds per week   Encouraged him to start tracking his protein intake and eat more frequently.  He needs his goal should be to eat at least 158 g of protein per day.  Orders:  •  tirzepatide (Zepbound) 2.5 mg/0.5 mL auto-injector; Inject 0.5 mL (2.5 mg total) under the skin once a week for 28 days     Vitamin D deficiency  Well-controlled on last blood work.  Continue with over-the-counter vitamin D daily.          Pure hypercholesterolemia  Weight loss to help. Update labs   Orders:  •  Lipid panel     Encounter for long-term (current) use of medications  Update labs   Orders:  •  Comprehensive metabolic panel  •  Lipid panel  •  Vitamin D 25 hydroxy  •  TSH, 3rd generation with Free T4  reflex  •  CBC and differential  •  Vitamin B12  •  Ferritin  •  Hemoglobin A1C    Intrinsic eczema  Refill   Orders:  •  mometasone (ELOCON) 0.1 % ointment; Apply topically 2 (two) times a day as needed (eczema)  •  Ambulatory Referral to Dermatology; Future     MDD (major depressive disorder), recurrent episode, moderate (HCC)  Depression Screening Follow-up Plan: Patient's depression screening was positive with a PHQ-9 score of 11. See below   Patient feels Zoloft 100 mg daily is helpful.  Does not feel he needs a change in dose.    Orders:  •  sertraline (ZOLOFT) 100 mg tablet; Take 1 tablet (100 mg total) by mouth daily     Anxiety  Patient feels Zoloft 100 mg daily is helpful.  Does not feel he needs a change in dose.    Orders:  •  sertraline (ZOLOFT) 100 mg tablet; Take 1 tablet (100 mg total) by mouth daily       Assessment & Plan          Follow up:   Return in about 5 months (around 2025) for virtual weight med check .        Ildefonso is a 35 y.o. male who presents today for follow up on chronic conditions.     Chief Complaint   Patient presents with   • Follow-up     F/u for meds. No new problems or concerns.      History of Present Illness     Concerns today:  Was doing well on zepbound   Had plateaued    Eating lots of protein   Eating once a day   Forgetting to take meds often   Adderall is helpful when he remembers to take it   Concentration is better  Not as hyperfocused and does not forget things or switch           Review of Systems  ROS:  all others negative - no chest pain, SOB, normal urine and bowels. no GERD. sleeping well. mood good.     PHQ-2/9 Depression Screening    Little interest or pleasure in doing things: 1 - several days  Feeling down, depressed, or hopeless: 0 - not at all  Trouble falling or staying asleep, or sleeping too much: 3 - nearly every day  Feeling tired or having little energy: 2 - more than half the days  Poor appetite or overeatin - several days  Feeling bad  "about yourself - or that you are a failure or have let yourself or your family down: 0 - not at all  Trouble concentrating on things, such as reading the newspaper or watching television: 3 - nearly every day  Moving or speaking so slowly that other people could have noticed. Or the opposite - being so fidgety or restless that you have been moving around a lot more than usual: 0 - not at all  Thoughts that you would be better off dead, or of hurting yourself in some way: 0 - not at all  PHQ-9 Score: 10  PHQ-9 Interpretation: Moderate depression        KEVIN-7 Flowsheet Screening      Flowsheet Row Most Recent Value   Over the last two weeks, how often have you been bothered by the following problems?     Feeling nervous, anxious, or on edge 1   Not being able to stop or control worrying 0   Worrying too much about different things 0   Trouble relaxing  1   Being so restless that it's hard to sit still 1   Becoming easily annoyed or irritable  3   Feeling afraid as if something awful might happen 0   How difficult have these problems made it for you to do your work, take care of things at home, or get along with other people?  Somewhat difficult   KEVIN Score  6            Objective   /78   Pulse 102   Temp 97.8 °F (36.6 °C) (Temporal)   Resp 18   Ht 5' 9\" (1.753 m)   Wt (!) 156 kg (344 lb)   SpO2 99%   BMI 50.80 kg/m²     /78   Pulse 102   Temp 97.8 °F (36.6 °C) (Temporal)   Resp 18   Ht 5' 9\" (1.753 m)   Wt (!) 156 kg (344 lb)   SpO2 99%   BMI 50.80 kg/m²     BP Readings from Last 3 Encounters:   07/08/25 128/78   04/07/25 128/96   12/30/24 130/86     Wt Readings from Last 3 Encounters:   07/08/25 (!) 156 kg (344 lb)   04/07/25 (!) 158 kg (349 lb)   12/30/24 (!) 171 kg (377 lb)       Physical Exam  Constitutional: he appears well-developed and well-nourished.   HENT: Head: Normocephalic.   Right Ear: External ear normal. Tympanic membrane normal.   Left Ear: External ear normal. Tympanic " membrane normal.   Nose: Nose normal. No mucosal edema, No rhinorrhea.   Right sinus exhibits no maxillary sinus tenderness.   Left sinus exhibits no maxillary sinus tenderness.   Mouth/Throat: Oropharynx is clear and moist.   Eyes: Normal conjunctiva.  No erythema. No discharge.  Neck: No pain on exam. Neck supple.   Cardiovascular: Normal rate, regular rhythm and normal heart sounds.    Pulmonary/Chest: Effort normal and breath sounds normal. No wheezes. No rales. No rhonchi.   Abdominal: Soft. Bowel sounds are normal. There is no tenderness.   Musculoskeletal: he exhibits no edema.   Lymphadenopathy: he has no cervical adenopathy.   Neurological: he  is alert and oriented to person, place, and time.   Skin: Skin is warm and dry. No rashes.  Psychiatric: he  has a normal mood and affect. his behavior is normal. Thought content normal.   Vitals reviewed.      Current Medications:  Current Medications[1]           [1]  Current Outpatient Medications   Medication Sig Dispense Refill   • amphetamine-dextroamphetamine (ADDERALL XR, 20MG,) 20 MG 24 hr capsule Take 1 capsule (20 mg total) by mouth every morning Max Daily Amount: 20 mg 30 capsule 0   • Cholecalciferol (VITAMIN D3) 1,000 units tablet Take 1 tablet (1,000 Units total) by mouth daily Over the counter     • desonide (DESOWEN) 0.05 % cream Apply topically 2 (two) times a day To dry itchy area. 2 weeks maximum. 15 g 1   • levothyroxine (Synthroid) 100 mcg tablet Take 1 tablet (100 mcg total) by mouth daily 90 tablet 2   • mometasone (ELOCON) 0.1 % ointment Apply topically 2 (two) times a day as needed (eczema) 45 g 2   • multivitamin (THERAGRAN) TABS Take 1 tablet by mouth in the morning.     • Omega-3 Fatty Acids (FISH OIL PO) Take 3 capsules by mouth in the morning.     • sertraline (ZOLOFT) 100 mg tablet Take 1 tablet (100 mg total) by mouth daily 90 tablet 2   • tirzepatide (Zepbound) 2.5 mg/0.5 mL auto-injector Inject 0.5 mL (2.5 mg total) under the skin  once a week for 28 days 2 mL 0   • Triamcinolone Acetonide (Nasacort Allergy 24HR) 55 MCG/ACT nasal spray 2 sprays by Each Nare route daily 16.9 mL 2     No current facility-administered medications for this visit.

## 2025-07-08 NOTE — ASSESSMENT & PLAN NOTE
Patient having repeat TSH level today.  Send refill in to [facility] based on the results.    Orders:    TSH, 3rd generation with Free T4 reflex    levothyroxine (Synthroid) 100 mcg tablet; Take 1 tablet (100 mcg total) by mouth daily

## 2025-07-08 NOTE — ASSESSMENT & PLAN NOTE
Patient doing well on Zepbound  Jan 2024 393   Dec 2025 377   4/7/2025 349   Weight loss 28 pounds, 7.4%     Averaging 2.5 pounds per week   Encouraged him to start tracking his protein intake and eat more frequently.  He needs his goal should be to eat at least 158 g of protein per day.  Orders:    tirzepatide (Zepbound) 2.5 mg/0.5 mL auto-injector; Inject 0.5 mL (2.5 mg total) under the skin once a week for 28 days

## 2025-07-08 NOTE — PATIENT INSTRUCTIONS
Dermatology Referral Names    [facility]  [address]  [address]  [place]  [address]   Phone:  [phone] Fax: [phone]     [facility]  [facility]  [address]  [address]  [place]  [address]   Phone:  [phone])     [facility]  [address]  [address]  [place]  [address]   Phone:  [phone]) Fax: [phone]     [facility]  [address] [place]  [address]   Phone:  [phone] Fax: [phone]     [facility]   Arturo Connelly Jr., M.D[facility]  Lashonda Roe MD, FAAD   Gudelia Ramirez DO FAAD   Kristin Barney MD, FAAD   [address], [address]   [place] [address] [phone]    Dr. Mehul Trevino  [address], [address], [place] [address]   [address], [place] [address]  [address] [place] [address] [phone]    [facility], [facility]  [address]  [address]  [place]  [id number] [phone]           Fax:    [phone]    [facility],   [address], [address]   [place] [address] [phone]     Dr. Kristin Joseph   [address] [place] [address] Rd[address],   [place] [address] [phone]     [facility]  [address], [address]  [place] [address] [phone]     [facility],  Dr. Stu Maldonado   [address] [place] [address]   [phone]      [facility]   [address] [place] [address]   [phone]      [facility]  [address], [place] [id number] [phone]     [facility]  [address] [place] [id number]  [phone]  [facility] [place] [address] [phone]     [facility]   Dr. Naldo Zeng   [address] [address]   [phone]

## 2025-07-08 NOTE — ASSESSMENT & PLAN NOTE
-He has not been on other medications in the past.  Strattera was prescribed but was not covered.  - tolerating Adderall XR 20 mg daily well.   Controlled Substance Review    [place] or [place]  reviewed: No red flags were identified; safe to proceed with prescription..    Orders:    amphetamine-dextroamphetamine (ADDERALL XR, 20MG,) 20 MG 24 hr capsule; Take 1 capsule (20 mg total) by mouth every morning Max Daily Amount: 20 mg

## 2025-07-12 NOTE — ASSESSMENT & PLAN NOTE
Refill   Orders:    mometasone (ELOCON) 0.1 % ointment; Apply topically 2 (two) times a day as needed (eczema)    Ambulatory Referral to Dermatology; Future

## 2025-07-12 NOTE — ASSESSMENT & PLAN NOTE
Depression Screening Follow-up Plan: Patient's depression screening was positive with a PHQ-9 score of 11. See below   Patient feels Zoloft 100 mg daily is helpful.  Does not feel he needs a change in dose.    Orders:    sertraline (ZOLOFT) 100 mg tablet; Take 1 tablet (100 mg total) by mouth daily

## 2025-07-12 NOTE — ASSESSMENT & PLAN NOTE
Patient feels Zoloft 100 mg daily is helpful.  Does not feel he needs a change in dose.    Orders:    sertraline (ZOLOFT) 100 mg tablet; Take 1 tablet (100 mg total) by mouth daily

## 2025-08-19 ENCOUNTER — PATIENT MESSAGE (OUTPATIENT)
Dept: FAMILY MEDICINE CLINIC | Facility: CLINIC | Age: 35
End: 2025-08-19

## 2025-08-19 DIAGNOSIS — E66.813 CLASS 3 SEVERE OBESITY DUE TO EXCESS CALORIES WITH SERIOUS COMORBIDITY AND BODY MASS INDEX (BMI) OF 50.0 TO 59.9 IN ADULT: Primary | ICD-10-CM

## 2025-08-21 RX ORDER — TIRZEPATIDE 5 MG/.5ML
5 INJECTION, SOLUTION SUBCUTANEOUS WEEKLY
Qty: 2 ML | Refills: 0 | Status: SHIPPED | OUTPATIENT
Start: 2025-08-21

## 2025-08-22 ENCOUNTER — TELEPHONE (OUTPATIENT)
Dept: FAMILY MEDICINE CLINIC | Facility: CLINIC | Age: 35
End: 2025-08-22